# Patient Record
Sex: FEMALE | Race: WHITE | Employment: OTHER | URBAN - METROPOLITAN AREA
[De-identification: names, ages, dates, MRNs, and addresses within clinical notes are randomized per-mention and may not be internally consistent; named-entity substitution may affect disease eponyms.]

---

## 2017-01-23 ENCOUNTER — TELEPHONE (OUTPATIENT)
Dept: FAMILY MEDICINE CLINIC | Facility: CLINIC | Age: 47
End: 2017-01-23

## 2017-01-23 NOTE — TELEPHONE ENCOUNTER
Patient states since Friday She has been having some lower abd cramping. States since She went off Medication Dr Nicol Solorzano had given Her post endoscopies  She had felt fine. Now cramping returning and Menstral Cycle irregular.     Appt given Wed 2/1/17 4:1

## 2017-02-01 ENCOUNTER — OFFICE VISIT (OUTPATIENT)
Dept: FAMILY MEDICINE CLINIC | Facility: CLINIC | Age: 47
End: 2017-02-01

## 2017-02-01 VITALS
HEIGHT: 66 IN | TEMPERATURE: 99 F | DIASTOLIC BLOOD PRESSURE: 70 MMHG | HEART RATE: 88 BPM | RESPIRATION RATE: 16 BRPM | SYSTOLIC BLOOD PRESSURE: 120 MMHG | WEIGHT: 144.13 LBS | BODY MASS INDEX: 23.16 KG/M2

## 2017-02-01 DIAGNOSIS — K90.0 CELIAC DISEASE: ICD-10-CM

## 2017-02-01 DIAGNOSIS — D50.8 OTHER IRON DEFICIENCY ANEMIA: ICD-10-CM

## 2017-02-01 DIAGNOSIS — K52.832 LYMPHOCYTIC COLITIS: Primary | ICD-10-CM

## 2017-02-01 DIAGNOSIS — R10.32 LEFT LOWER QUADRANT PAIN: ICD-10-CM

## 2017-02-01 DIAGNOSIS — N92.0 EXCESSIVE OR FREQUENT MENSTRUATION: ICD-10-CM

## 2017-02-01 PROBLEM — Z00.00 ROUTINE GENERAL MEDICAL EXAMINATION AT A HEALTH CARE FACILITY: Status: ACTIVE | Noted: 2017-01-09

## 2017-02-01 LAB
25-HYDROXYVITAMIN D (TOTAL): 22.3 NG/ML (ref 30–100)
ALBUMIN SERPL-MCNC: 4 G/DL (ref 3.5–4.8)
ALP LIVER SERPL-CCNC: 88 U/L (ref 39–100)
ALT SERPL-CCNC: 16 U/L (ref 14–54)
ANTI-THYROGLOBULIN: <15 U/ML (ref ?–60)
ANTI-THYROPEROXIDASE: <28 U/ML (ref ?–60)
AST SERPL-CCNC: 12 U/L (ref 15–41)
BASOPHILS # BLD AUTO: 0.04 X10(3) UL (ref 0–0.1)
BASOPHILS NFR BLD AUTO: 0.5 %
BILIRUB SERPL-MCNC: 0.3 MG/DL (ref 0.1–2)
BUN BLD-MCNC: 14 MG/DL (ref 8–20)
CALCIUM BLD-MCNC: 8.9 MG/DL (ref 8.3–10.3)
CHLORIDE: 107 MMOL/L (ref 101–111)
CO2: 27 MMOL/L (ref 22–32)
CREAT BLD-MCNC: 0.87 MG/DL (ref 0.55–1.02)
EOSINOPHIL # BLD AUTO: 0.2 X10(3) UL (ref 0–0.3)
EOSINOPHIL NFR BLD AUTO: 2.6 %
ERYTHROCYTE [DISTWIDTH] IN BLOOD BY AUTOMATED COUNT: 12.4 % (ref 11.5–16)
FREE T4: 1.1 NG/DL (ref 0.9–1.8)
GLUCOSE BLD-MCNC: 94 MG/DL (ref 70–99)
HCT VFR BLD AUTO: 42 % (ref 34–50)
HGB BLD-MCNC: 13.9 G/DL (ref 12–16)
IMMATURE GRANULOCYTE COUNT: 0.01 X10(3) UL (ref 0–1)
IMMATURE GRANULOCYTE RATIO %: 0.1 %
IRON SATURATION: 16 % (ref 13–45)
IRON: 54 UG/DL (ref 28–170)
LYMPHOCYTES # BLD AUTO: 2.43 X10(3) UL (ref 0.9–4)
LYMPHOCYTES NFR BLD AUTO: 31.6 %
M PROTEIN MFR SERPL ELPH: 7.7 G/DL (ref 6.1–8.3)
MCH RBC QN AUTO: 31.2 PG (ref 27–33.2)
MCHC RBC AUTO-ENTMCNC: 33.1 G/DL (ref 31–37)
MCV RBC AUTO: 94.4 FL (ref 81–100)
MONOCYTES # BLD AUTO: 0.48 X10(3) UL (ref 0.1–0.6)
MONOCYTES NFR BLD AUTO: 6.2 %
NEUTROPHIL ABS PRELIM: 4.53 X10 (3) UL (ref 1.3–6.7)
NEUTROPHILS # BLD AUTO: 4.53 X10(3) UL (ref 1.3–6.7)
NEUTROPHILS NFR BLD AUTO: 59 %
PLATELET # BLD AUTO: 320 10(3)UL (ref 150–450)
POTASSIUM SERPL-SCNC: 4 MMOL/L (ref 3.6–5.1)
RBC # BLD AUTO: 4.45 X10(6)UL (ref 3.8–5.1)
RED CELL DISTRIBUTION WIDTH-SD: 43 FL (ref 35.1–46.3)
SODIUM SERPL-SCNC: 141 MMOL/L (ref 136–144)
T3FREE SERPL-MCNC: 2.85 PG/ML (ref 2.3–4.2)
TOTAL IRON BINDING CAPACITY: 337 UG/DL (ref 298–536)
TRANSFERRIN: 226 MG/DL (ref 200–360)
TSI SER-ACNC: 1.06 MIU/ML (ref 0.35–5.5)
WBC # BLD AUTO: 7.7 X10(3) UL (ref 4–13)

## 2017-02-01 PROCEDURE — 84482 T3 REVERSE: CPT | Performed by: FAMILY MEDICINE

## 2017-02-01 PROCEDURE — 82627 DEHYDROEPIANDROSTERONE: CPT | Performed by: FAMILY MEDICINE

## 2017-02-01 PROCEDURE — 84439 ASSAY OF FREE THYROXINE: CPT | Performed by: FAMILY MEDICINE

## 2017-02-01 PROCEDURE — 83550 IRON BINDING TEST: CPT | Performed by: FAMILY MEDICINE

## 2017-02-01 PROCEDURE — 84443 ASSAY THYROID STIM HORMONE: CPT | Performed by: FAMILY MEDICINE

## 2017-02-01 PROCEDURE — 85025 COMPLETE CBC W/AUTO DIFF WBC: CPT | Performed by: FAMILY MEDICINE

## 2017-02-01 PROCEDURE — 83540 ASSAY OF IRON: CPT | Performed by: FAMILY MEDICINE

## 2017-02-01 PROCEDURE — 80050 GENERAL HEALTH PANEL: CPT | Performed by: FAMILY MEDICINE

## 2017-02-01 PROCEDURE — 99214 OFFICE O/P EST MOD 30 MIN: CPT | Performed by: FAMILY MEDICINE

## 2017-02-01 PROCEDURE — 86800 THYROGLOBULIN ANTIBODY: CPT | Performed by: FAMILY MEDICINE

## 2017-02-01 PROCEDURE — 82306 VITAMIN D 25 HYDROXY: CPT | Performed by: FAMILY MEDICINE

## 2017-02-01 PROCEDURE — 86376 MICROSOMAL ANTIBODY EACH: CPT | Performed by: FAMILY MEDICINE

## 2017-02-01 PROCEDURE — 80053 COMPREHEN METABOLIC PANEL: CPT | Performed by: FAMILY MEDICINE

## 2017-02-01 PROCEDURE — 84481 FREE ASSAY (FT-3): CPT | Performed by: FAMILY MEDICINE

## 2017-02-01 RX ORDER — MELATONIN
1000 DAILY
COMMUNITY

## 2017-02-01 RX ORDER — CHOLESTYRAMINE LIGHT 4 G/5.7G
POWDER, FOR SUSPENSION ORAL
Refills: 3 | COMMUNITY
Start: 2017-01-10 | End: 2020-06-02 | Stop reason: ALTCHOICE

## 2017-02-01 RX ORDER — FERROUS SULFATE 325(65) MG
325 TABLET ORAL DAILY
COMMUNITY
Start: 2015-10-23 | End: 2020-06-02 | Stop reason: ALTCHOICE

## 2017-02-01 RX ORDER — DIPHENOXYLATE HCL/ATROPINE 2.5-.025MG
TABLET ORAL AS NEEDED
COMMUNITY
Start: 2016-07-18 | End: 2020-09-04

## 2017-02-01 RX ORDER — DULOXETIN HYDROCHLORIDE 20 MG/1
CAPSULE, DELAYED RELEASE ORAL
COMMUNITY
Start: 2014-08-04 | End: 2018-05-30

## 2017-02-01 NOTE — PROGRESS NOTES
[facility] [place]  PROGRESS NOTE  Chief Complaint:   Patient presents with:  Menstrual Problem  Abdominal Pain      HPI:   This is a 50year old female coming in for abdominal pain and abnormal bleeding.   Keena Leone had significant bloating and Oral Tab  Disp:  Rfl:    diphenoxylate-atropine (LOMOTIL) 2.5-0.025 MG Oral Tab  Disp:  Rfl:    DULoxetine HCl (CYMBALTA) 20 MG Oral Cap DR Particles  Disp:  Rfl:    Ferrous Sulfate ([facility] IRON) 325 (65 Fe) MG Oral Tab  Disp:  Rfl:    Hyoscyamine Sulfate 0.1 Temp([facility]) 99 °F (37.2 °C) (Temporal)  Resp 16  Ht 66\"  Wt 144 lb 2 oz  BMI 23.27 kg/m2  LMP 01/18/2017 (Exact Date)  Breastfeeding? No Estimated body mass index is 23.27 kg/(m^2) as calculated from the following:    Height as of this encounter: 66\".     Lorri Funes thyroglobulin ab [E]  - TSH and Free T4 [E]  - Triiodothyronine,Free,Serum [E]  - Reverse T3, Serum [E]  - Vitamin D, 25-Hydroxy [E]  - Iron And Tibc [E]  - CBC W Differential W Platelet  - CBC W/ DIFFERENTIAL; Future  - CBC W/ DIFFERENTIAL    2.  Lymphocyt Reverse T3, Serum [E]  - Vitamin D, 25-Hydroxy [E]  - Iron And Tibc [E]  - CBC W Differential W Platelet  - CBC W/ DIFFERENTIAL; Future  - CBC W/ DIFFERENTIAL    4. Other iron deficiency anemia  She has a history of iron deficiency anemia.   I recommend millie Smear,3 Years due on 01/03/1999  Mammogram,1 Yr due on 01/03/2008  Influenza Vaccine(1) due on 09/01/2016    Patient/Caregiver Education: Patient/Caregiver Education: There are no barriers to learning. Medical education done.    Outcome: Patient verbalizes

## 2017-02-03 LAB — DHEA SULFATE, SERUM: 142 UG/DL

## 2017-02-04 LAB — TRIIODOTHYRONINE, REVERSE: 20.1 NG/DL

## 2017-02-07 ENCOUNTER — TELEPHONE (OUTPATIENT)
Dept: FAMILY MEDICINE CLINIC | Facility: CLINIC | Age: 47
End: 2017-02-07

## 2017-02-09 NOTE — TELEPHONE ENCOUNTER
States She is returning Dr Dowling Franc Call regarding Her Labs.   Antwan Land, 02/08/2017, 7:44 PM

## 2017-02-15 ENCOUNTER — TELEPHONE (OUTPATIENT)
Dept: FAMILY MEDICINE CLINIC | Facility: CLINIC | Age: 47
End: 2017-02-15

## 2017-02-15 DIAGNOSIS — E55.9 VITAMIN D DEFICIENCY: Primary | ICD-10-CM

## 2017-02-15 NOTE — TELEPHONE ENCOUNTER
I called and spoke with Lulu Hayden about her lab results. Her vitamin D level was very low at 22. The rest of her labs were essentially normal.  I recommend that she start taking vitamin D 5000 international units daily.   We will recheck her vitamin D level in

## 2017-04-19 ENCOUNTER — APPOINTMENT (OUTPATIENT)
Dept: LAB | Age: 47
End: 2017-04-19
Attending: FAMILY MEDICINE
Payer: COMMERCIAL

## 2017-04-19 DIAGNOSIS — E55.9 VITAMIN D DEFICIENCY: ICD-10-CM

## 2017-04-19 PROCEDURE — 82306 VITAMIN D 25 HYDROXY: CPT | Performed by: FAMILY MEDICINE

## 2018-01-17 ENCOUNTER — LABORATORY ENCOUNTER (OUTPATIENT)
Dept: LAB | Age: 48
End: 2018-01-17
Attending: FAMILY MEDICINE
Payer: COMMERCIAL

## 2018-01-17 DIAGNOSIS — E65 LOCALIZED ADIPOSITY: Primary | ICD-10-CM

## 2018-01-17 DIAGNOSIS — N64.0 FISSURE OF NIPPLE: ICD-10-CM

## 2018-01-17 LAB
APTT PPP: 32.4 SECONDS (ref 25–34)
BASOPHILS # BLD AUTO: 0.04 X10(3) UL (ref 0–0.1)
BASOPHILS NFR BLD AUTO: 0.7 %
EOSINOPHIL # BLD AUTO: 0.24 X10(3) UL (ref 0–0.3)
EOSINOPHIL NFR BLD AUTO: 4.2 %
ERYTHROCYTE [DISTWIDTH] IN BLOOD BY AUTOMATED COUNT: 12.7 % (ref 11.5–16)
HCT VFR BLD AUTO: 40.5 % (ref 34–50)
HGB BLD-MCNC: 13.6 G/DL (ref 12–16)
IMMATURE GRANULOCYTE COUNT: 0.01 X10(3) UL (ref 0–1)
IMMATURE GRANULOCYTE RATIO %: 0.2 %
INR BLD: 1 (ref 0.89–1.11)
LYMPHOCYTES # BLD AUTO: 1.88 X10(3) UL (ref 0.9–4)
LYMPHOCYTES NFR BLD AUTO: 32.6 %
MCH RBC QN AUTO: 31.5 PG (ref 27–33.2)
MCHC RBC AUTO-ENTMCNC: 33.6 G/DL (ref 31–37)
MCV RBC AUTO: 93.8 FL (ref 81–100)
MONOCYTES # BLD AUTO: 0.47 X10(3) UL (ref 0.1–0.6)
MONOCYTES NFR BLD AUTO: 8.1 %
NEUTROPHIL ABS PRELIM: 3.13 X10 (3) UL (ref 1.3–6.7)
NEUTROPHILS # BLD AUTO: 3.13 X10(3) UL (ref 1.3–6.7)
NEUTROPHILS NFR BLD AUTO: 54.2 %
PLATELET # BLD AUTO: 278 10(3)UL (ref 150–450)
PSA SERPL DL<=0.01 NG/ML-MCNC: 13.2 SECONDS (ref 12–14.3)
RBC # BLD AUTO: 4.32 X10(6)UL (ref 3.8–5.1)
RED CELL DISTRIBUTION WIDTH-SD: 43.8 FL (ref 35.1–46.3)
WBC # BLD AUTO: 5.8 X10(3) UL (ref 4–13)

## 2018-01-17 PROCEDURE — 36415 COLL VENOUS BLD VENIPUNCTURE: CPT

## 2018-01-17 PROCEDURE — 85730 THROMBOPLASTIN TIME PARTIAL: CPT

## 2018-01-17 PROCEDURE — 85025 COMPLETE CBC W/AUTO DIFF WBC: CPT

## 2018-01-17 PROCEDURE — 85610 PROTHROMBIN TIME: CPT

## 2018-04-16 ENCOUNTER — TELEPHONE (OUTPATIENT)
Dept: FAMILY MEDICINE CLINIC | Facility: CLINIC | Age: 48
End: 2018-04-16

## 2018-04-16 DIAGNOSIS — K52.832 LYMPHOCYTIC COLITIS: Primary | ICD-10-CM

## 2018-04-16 NOTE — TELEPHONE ENCOUNTER
Unfortunately, Dr Michel Zuñiga is not in network. I would recommend one of the gastroenterologists in the [address] [place] [address] network. I have met and like Dr Orin Gonzales - he is at the [address] [place] [address] of [facility], [facility].

## 2018-04-16 NOTE — TELEPHONE ENCOUNTER
needs referrel to Dr. Suha Georges at [facility].  needs since has HMO insurance

## 2018-04-16 NOTE — TELEPHONE ENCOUNTER
Chris Hull informed of below. Would like Referal to Dr Salomón Broussard. Please advise.   Mikel Melgoza, 04/16/18, 5:17 PM

## 2018-04-16 NOTE — TELEPHONE ENCOUNTER
As below. Please advise Referal to Dr Duane Fix at [facility].   Ethan Richardson, 04/16/18, 4:54 PM

## 2018-04-26 ENCOUNTER — TELEPHONE (OUTPATIENT)
Dept: GASTROENTEROLOGY | Facility: CLINIC | Age: 48
End: 2018-04-26

## 2018-04-27 NOTE — TELEPHONE ENCOUNTER
Records reviewed:       8/2016 EGD/C-scope (Dr. Maida Carlisle)  -diverticulosis of sigmoid  -int/ext hemorrhoids  -normal ileum  -good prep  -normal appearing colon  -EGD: esophagitis at GE junction, gastric erythema, mild duodenitis --->plan avoid NSAIDS/

## 2018-04-27 NOTE — TELEPHONE ENCOUNTER
Dr Sandie Strickland, pt has appt with you as new pt on 5/21. Received egd/colon records/paths, labs from [facility]. Did not know if you wanted to see them before appt. I attached scan sheet so send on to scan when done. Thanks.

## 2018-05-21 ENCOUNTER — APPOINTMENT (OUTPATIENT)
Dept: LAB | Facility: HOSPITAL | Age: 48
End: 2018-05-21
Attending: INTERNAL MEDICINE
Payer: COMMERCIAL

## 2018-05-21 ENCOUNTER — OFFICE VISIT (OUTPATIENT)
Dept: GASTROENTEROLOGY | Facility: CLINIC | Age: 48
End: 2018-05-21

## 2018-05-21 VITALS
BODY MASS INDEX: 20.25 KG/M2 | HEIGHT: 66 IN | SYSTOLIC BLOOD PRESSURE: 118 MMHG | DIASTOLIC BLOOD PRESSURE: 75 MMHG | WEIGHT: 126 LBS | HEART RATE: 67 BPM

## 2018-05-21 DIAGNOSIS — R19.7 DIARRHEA, UNSPECIFIED TYPE: ICD-10-CM

## 2018-05-21 DIAGNOSIS — K52.832 LYMPHOCYTIC COLITIS: ICD-10-CM

## 2018-05-21 DIAGNOSIS — K90.0 CELIAC DISEASE: ICD-10-CM

## 2018-05-21 DIAGNOSIS — K90.0 CELIAC DISEASE: Primary | ICD-10-CM

## 2018-05-21 PROCEDURE — 83540 ASSAY OF IRON: CPT

## 2018-05-21 PROCEDURE — 82380 ASSAY OF CAROTENE: CPT

## 2018-05-21 PROCEDURE — 99244 OFF/OP CNSLTJ NEW/EST MOD 40: CPT | Performed by: INTERNAL MEDICINE

## 2018-05-21 PROCEDURE — 83735 ASSAY OF MAGNESIUM: CPT

## 2018-05-21 PROCEDURE — 84466 ASSAY OF TRANSFERRIN: CPT

## 2018-05-21 PROCEDURE — 36415 COLL VENOUS BLD VENIPUNCTURE: CPT

## 2018-05-21 PROCEDURE — 84630 ASSAY OF ZINC: CPT

## 2018-05-21 PROCEDURE — 99212 OFFICE O/P EST SF 10 MIN: CPT | Performed by: INTERNAL MEDICINE

## 2018-05-21 PROCEDURE — 82728 ASSAY OF FERRITIN: CPT

## 2018-05-21 PROCEDURE — 82607 VITAMIN B-12: CPT

## 2018-05-21 PROCEDURE — 84425 ASSAY OF VITAMIN B-1: CPT

## 2018-05-21 PROCEDURE — 86256 FLUORESCENT ANTIBODY TITER: CPT

## 2018-05-21 RX ORDER — BUDESONIDE 3 MG/1
9 CAPSULE, COATED PELLETS ORAL EVERY MORNING
Qty: 90 CAPSULE | Refills: 1 | Status: SHIPPED | OUTPATIENT
Start: 2018-05-21 | End: 2018-06-20

## 2018-05-21 NOTE — PATIENT INSTRUCTIONS
1. Budesonide 9mg daily x 4 weeks, then reduce to 6mg x 2 weeks, then reduce to 3mg x 2 weeks, then stop. 2. Okay to continue low FODMAP diet  3. Blood work  4. DEXA scan  5.  Take lomotil as needed 2-3x a week as needed for diarrhea

## 2018-05-21 NOTE — H&P
[address] [facility] [address] Gastroenterology                                                                                                               Reason for consult: C prep  -normal appearing colon  -EGD: esophagitis at GE junction, gastric erythema, mild duodenitis --->plan avoid NSAIDS/and start omep 20/day.      8.2016 PATH  -duodenal biopsy: small bowel wnl, no celiac disease  -gastric biopsies: wnl, neg Hpylori  -di diplopia   RESPIRATORY:  negative for severe shortness of breath  CARDIOVASCULAR:  negative for crushing sub-sternal chest pain  GASTROINTESTINAL:  see HPI  GENITOURINARY:  negative for dysuria or gross hematuria  INTEGUMENT/BREAST:  SKIN:  negative for ja Therefore, I recommend prophylactic administration of pneumococcal vaccine. #Lymphocytic colitis/diarrhea - sx started in 2016 with diarrhea, ab pain. EGD/CLN done at that time with CLN biopsies showed lymphocytic colitis.  She was briefly on budesonide

## 2018-05-25 ENCOUNTER — TELEPHONE (OUTPATIENT)
Dept: GASTROENTEROLOGY | Facility: CLINIC | Age: 48
End: 2018-05-25

## 2018-05-30 RX ORDER — DULOXETIN HYDROCHLORIDE 20 MG/1
20 CAPSULE, DELAYED RELEASE ORAL DAILY
Qty: 90 CAPSULE | Refills: 0 | Status: SHIPPED | OUTPATIENT
Start: 2018-05-30 | End: 2018-08-29

## 2018-05-30 NOTE — TELEPHONE ENCOUNTER
Future appt:     Your appointments     Date & Time Appointment Department [facility])    Jun 11, 2018  8:20 AM CDT XR DEXA BONE DENSITOMETRY with [facility] RAMOS DEXA RM [address] Dexa Department located in [facility])    It is recommended that you wea Results  Component Value Date   T4F 1.1 02/01/2017   TSH 1.060 02/01/2017       No Follow-up on file.

## 2018-06-11 ENCOUNTER — TELEPHONE (OUTPATIENT)
Dept: GASTROENTEROLOGY | Facility: CLINIC | Age: 48
End: 2018-06-11

## 2018-06-11 ENCOUNTER — HOSPITAL ENCOUNTER (OUTPATIENT)
Dept: BONE DENSITY | Age: 48
Discharge: HOME | End: 2018-06-11
Attending: INTERNAL MEDICINE
Payer: COMMERCIAL

## 2018-06-11 DIAGNOSIS — K90.0 CELIAC DISEASE: ICD-10-CM

## 2018-06-11 PROCEDURE — 77080 DXA BONE DENSITY AXIAL: CPT | Performed by: INTERNAL MEDICINE

## 2018-06-11 NOTE — TELEPHONE ENCOUNTER
Left msg for patient re: DEXA showing osteopenia. I recommend she see her PCP and discuss this findings. She is on calcium.

## 2018-06-13 ENCOUNTER — OFFICE VISIT (OUTPATIENT)
Dept: FAMILY MEDICINE CLINIC | Facility: CLINIC | Age: 48
End: 2018-06-13

## 2018-06-13 VITALS
RESPIRATION RATE: 14 BRPM | BODY MASS INDEX: 19.64 KG/M2 | WEIGHT: 122.19 LBS | OXYGEN SATURATION: 98 % | DIASTOLIC BLOOD PRESSURE: 68 MMHG | TEMPERATURE: 98 F | HEIGHT: 66 IN | SYSTOLIC BLOOD PRESSURE: 106 MMHG | HEART RATE: 76 BPM

## 2018-06-13 DIAGNOSIS — M85.88 OSTEOPENIA OF LUMBAR SPINE: ICD-10-CM

## 2018-06-13 DIAGNOSIS — Z00.00 ROUTINE GENERAL MEDICAL EXAMINATION AT A HEALTH CARE FACILITY: Primary | ICD-10-CM

## 2018-06-13 DIAGNOSIS — Z98.890 S/P BILATERAL BREAST REDUCTION: ICD-10-CM

## 2018-06-13 DIAGNOSIS — E55.9 VITAMIN D DEFICIENCY: ICD-10-CM

## 2018-06-13 PROBLEM — M79.7 FIBROMYALGIA: Status: ACTIVE | Noted: 2018-06-13

## 2018-06-13 PROCEDURE — G0439 PPPS, SUBSEQ VISIT: HCPCS | Performed by: NURSE PRACTITIONER

## 2018-06-13 PROCEDURE — 99396 PREV VISIT EST AGE 40-64: CPT | Performed by: NURSE PRACTITIONER

## 2018-06-13 NOTE — PROGRESS NOTES
HPI:    Patient ID: Esther Arriaga is a 46year old female. HPI    Present for annual wellness. States that she is generally doing well. Takes cymbalta for her fibro. Sees Dr. Enma Laguerre for her GI. Will go to [facility] for mammo.  Wanted to do at ou normal. Pupils are equal, round, and reactive to light. Neck: Normal range of motion. Neck supple. Cardiovascular: Normal rate, regular rhythm, normal heart sounds and intact distal pulses.     Pulmonary/Chest: Effort normal and breath sounds normal. Ri

## 2018-06-14 NOTE — PATIENT INSTRUCTIONS
Schedule mammogram.   Continue with current therapy. Recheck annually and as needed - due for pap next year.

## 2018-06-21 ENCOUNTER — PATIENT MESSAGE (OUTPATIENT)
Dept: GASTROENTEROLOGY | Facility: CLINIC | Age: 48
End: 2018-06-21

## 2018-08-29 RX ORDER — DULOXETIN HYDROCHLORIDE 20 MG/1
CAPSULE, DELAYED RELEASE ORAL
Qty: 90 CAPSULE | Refills: 3 | Status: SHIPPED | OUTPATIENT
Start: 2018-08-29 | End: 2020-06-02 | Stop reason: ALTCHOICE

## 2018-08-29 NOTE — TELEPHONE ENCOUNTER
Future appt:    Last Appointment:  6/13/2018, Physical with Melissa    No results found for: CHOLEST, HDL, LDL, TRIGLY, TRIG  No results found for: EAG, A1C    Lab Results  Component Value Date   T4F 1.1 02/01/2017   TSH 1.060 02/01/2017     Last RF:  5/30/2

## 2018-09-24 ENCOUNTER — TELEPHONE (OUTPATIENT)
Dept: FAMILY MEDICINE CLINIC | Facility: CLINIC | Age: 48
End: 2018-09-24

## 2018-09-24 NOTE — TELEPHONE ENCOUNTER
Order dated 6/13/2018 faxed to [address] [place] [address] at the number given below as requested. Patient informed.

## 2018-09-24 NOTE — TELEPHONE ENCOUNTER
Patient has orders for a mammo, would like dr to fax those orders to [facility] fax number [phone]

## 2018-10-15 ENCOUNTER — HOSPITAL ENCOUNTER (OUTPATIENT)
Dept: MAMMOGRAPHY | Age: 48
Discharge: HOME | End: 2018-10-15
Attending: NURSE PRACTITIONER
Payer: COMMERCIAL

## 2018-10-15 DIAGNOSIS — Z98.890 S/P BILATERAL BREAST REDUCTION: ICD-10-CM

## 2018-10-15 PROCEDURE — 77067 SCR MAMMO BI INCL CAD: CPT | Performed by: NURSE PRACTITIONER

## 2018-10-15 PROCEDURE — 77063 BREAST TOMOSYNTHESIS BI: CPT | Performed by: NURSE PRACTITIONER

## 2018-10-16 ENCOUNTER — TELEPHONE (OUTPATIENT)
Dept: FAMILY MEDICINE CLINIC | Facility: CLINIC | Age: 48
End: 2018-10-16

## 2018-10-16 NOTE — TELEPHONE ENCOUNTER
----- Message from MARIA ANTONIA Conti sent at 10/16/2018  9:52 AM CDT -----  Mammogram is read as normal.  Regular follow-up is recommended.   However secondary to the density of the breast, if any concerns would consider further test of molecular breast

## 2019-01-17 ENCOUNTER — TELEPHONE (OUTPATIENT)
Dept: FAMILY MEDICINE CLINIC | Facility: CLINIC | Age: 49
End: 2019-01-17

## 2019-01-17 DIAGNOSIS — Z00.01 ENCOUNTER FOR GENERAL ADULT MEDICAL EXAMINATION WITH ABNORMAL FINDINGS: ICD-10-CM

## 2019-01-17 DIAGNOSIS — K52.832 LYMPHOCYTIC COLITIS: ICD-10-CM

## 2019-01-17 DIAGNOSIS — K90.0 CELIAC DISEASE: Primary | ICD-10-CM

## 2019-01-17 NOTE — TELEPHONE ENCOUNTER
Future Appointments   Date Time Provider Cheryl Durant   1/22/2019 10:15 AM REF [place] REF [facility] Ref Syc

## 2019-01-17 NOTE — TELEPHONE ENCOUNTER
----- Message from Evelyn Ding sent at 1/17/2019  1:31 PM CST -----  Regarding: lab orders needed   Patient has lab appointment. Could you please put lab orders in system.           Thanks,  Christelle

## 2019-01-22 ENCOUNTER — LABORATORY ENCOUNTER (OUTPATIENT)
Dept: LAB | Age: 49
End: 2019-01-22
Attending: FAMILY MEDICINE
Payer: COMMERCIAL

## 2019-01-22 DIAGNOSIS — Z00.01 ENCOUNTER FOR GENERAL ADULT MEDICAL EXAMINATION WITH ABNORMAL FINDINGS: ICD-10-CM

## 2019-01-22 DIAGNOSIS — M85.88 OSTEOPENIA OF LUMBAR SPINE: ICD-10-CM

## 2019-01-22 DIAGNOSIS — E55.9 VITAMIN D DEFICIENCY: ICD-10-CM

## 2019-01-22 DIAGNOSIS — K90.0 CELIAC DISEASE: ICD-10-CM

## 2019-01-22 DIAGNOSIS — K52.832 LYMPHOCYTIC COLITIS: ICD-10-CM

## 2019-01-22 LAB
ALBUMIN SERPL-MCNC: 3.8 G/DL (ref 3.1–4.5)
ALBUMIN/GLOB SERPL: 1.2 {RATIO} (ref 1–2)
ALP LIVER SERPL-CCNC: 80 U/L (ref 41–108)
ALT SERPL-CCNC: 27 U/L (ref 14–54)
ANION GAP SERPL CALC-SCNC: 9 MMOL/L (ref 0–18)
AST SERPL-CCNC: 27 U/L (ref 15–41)
BASOPHILS # BLD AUTO: 0.04 X10(3) UL (ref 0–0.1)
BASOPHILS NFR BLD AUTO: 0.7 %
BILIRUB SERPL-MCNC: 0.5 MG/DL (ref 0.1–2)
BUN BLD-MCNC: 18 MG/DL (ref 8–20)
BUN/CREAT SERPL: 17.3 (ref 10–20)
CALCIUM BLD-MCNC: 9.1 MG/DL (ref 8.3–10.3)
CHLORIDE SERPL-SCNC: 107 MMOL/L (ref 101–111)
CHOLEST SMN-MCNC: 181 MG/DL (ref ?–200)
CO2 SERPL-SCNC: 26 MMOL/L (ref 22–32)
CREAT BLD-MCNC: 1.04 MG/DL (ref 0.55–1.02)
EOSINOPHIL # BLD AUTO: 0.16 X10(3) UL (ref 0–0.3)
EOSINOPHIL NFR BLD AUTO: 2.8 %
ERYTHROCYTE [DISTWIDTH] IN BLOOD BY AUTOMATED COUNT: 12.8 % (ref 11.5–16)
GLOBULIN PLAS-MCNC: 3.2 G/DL (ref 2.8–4.4)
GLUCOSE BLD-MCNC: 89 MG/DL (ref 70–99)
HCT VFR BLD AUTO: 38.9 % (ref 34–50)
HDLC SERPL-MCNC: 81 MG/DL (ref 40–59)
HGB BLD-MCNC: 12.8 G/DL (ref 12–16)
IMMATURE GRANULOCYTE COUNT: 0.01 X10(3) UL (ref 0–1)
IMMATURE GRANULOCYTE RATIO %: 0.2 %
LDLC SERPL CALC-MCNC: 82 MG/DL (ref ?–100)
LYMPHOCYTES # BLD AUTO: 1.84 X10(3) UL (ref 0.9–4)
LYMPHOCYTES NFR BLD AUTO: 32.5 %
M PROTEIN MFR SERPL ELPH: 7 G/DL (ref 6.4–8.2)
MCH RBC QN AUTO: 30.8 PG (ref 27–33.2)
MCHC RBC AUTO-ENTMCNC: 32.9 G/DL (ref 31–37)
MCV RBC AUTO: 93.5 FL (ref 81–100)
MONOCYTES # BLD AUTO: 0.39 X10(3) UL (ref 0.1–1)
MONOCYTES NFR BLD AUTO: 6.9 %
NEUTROPHIL ABS PRELIM: 3.22 X10 (3) UL (ref 1.3–6.7)
NEUTROPHILS # BLD AUTO: 3.22 X10(3) UL (ref 1.3–6.7)
NEUTROPHILS NFR BLD AUTO: 56.9 %
NONHDLC SERPL-MCNC: 100 MG/DL (ref ?–130)
OSMOLALITY SERPL CALC.SUM OF ELEC: 295 MOSM/KG (ref 275–295)
PLATELET # BLD AUTO: 298 10(3)UL (ref 150–450)
POTASSIUM SERPL-SCNC: 3.8 MMOL/L (ref 3.6–5.1)
RBC # BLD AUTO: 4.16 X10(6)UL (ref 3.8–5.1)
RED CELL DISTRIBUTION WIDTH-SD: 44.2 FL (ref 35.1–46.3)
SODIUM SERPL-SCNC: 142 MMOL/L (ref 136–144)
TRIGL SERPL-MCNC: 91 MG/DL (ref 30–149)
TSI SER-ACNC: 1.1 MIU/ML (ref 0.35–5.5)
URATE SERPL-MCNC: 4.9 MG/DL (ref 2.4–8)
VIT D+METAB SERPL-MCNC: 52.2 NG/ML (ref 30–100)
VLDLC SERPL CALC-MCNC: 18 MG/DL (ref 0–30)
WBC # BLD AUTO: 5.7 X10(3) UL (ref 4–13)

## 2019-01-22 PROCEDURE — 80053 COMPREHEN METABOLIC PANEL: CPT

## 2019-01-22 PROCEDURE — 85025 COMPLETE CBC W/AUTO DIFF WBC: CPT

## 2019-01-22 PROCEDURE — 80061 LIPID PANEL: CPT

## 2019-01-22 PROCEDURE — 84443 ASSAY THYROID STIM HORMONE: CPT

## 2019-01-22 PROCEDURE — 36415 COLL VENOUS BLD VENIPUNCTURE: CPT

## 2019-01-22 PROCEDURE — 84550 ASSAY OF BLOOD/URIC ACID: CPT

## 2019-01-22 PROCEDURE — 82306 VITAMIN D 25 HYDROXY: CPT

## 2019-01-23 ENCOUNTER — TELEPHONE (OUTPATIENT)
Dept: FAMILY MEDICINE CLINIC | Facility: CLINIC | Age: 49
End: 2019-01-23

## 2019-05-03 ENCOUNTER — TELEPHONE (OUTPATIENT)
Dept: FAMILY MEDICINE CLINIC | Facility: CLINIC | Age: 49
End: 2019-05-03

## 2019-05-03 NOTE — TELEPHONE ENCOUNTER
Patient would like to know if 's are recommending mmr booster elder patients and at what age is the booster recommended. Please leave a message if needed.

## 2019-05-03 NOTE — TELEPHONE ENCOUNTER
MMR booster is not officially recommended unless there is a specific measles exposure. In [facility] we have had a case of measles but it was kept quarantined and there have been no new cases.   The only group of people I am recommending revaccination f

## 2020-03-31 ENCOUNTER — TELEPHONE (OUTPATIENT)
Dept: FAMILY MEDICINE CLINIC | Facility: CLINIC | Age: 50
End: 2020-03-31

## 2020-03-31 DIAGNOSIS — K52.832 LYMPHOCYTIC COLITIS: Primary | ICD-10-CM

## 2020-03-31 NOTE — TELEPHONE ENCOUNTER
Patient has HMO through [employer]. Needing new Referral to . M/L on VM-- informed she has not been seen here since 2018 and needs to call to schedule appt with Melissa WRIGHT or  for continuation of care and continued Referrals.   Lydia Miramontes,

## 2020-06-02 ENCOUNTER — OFFICE VISIT (OUTPATIENT)
Dept: FAMILY MEDICINE CLINIC | Facility: CLINIC | Age: 50
End: 2020-06-02

## 2020-06-02 VITALS
WEIGHT: 117 LBS | HEART RATE: 72 BPM | RESPIRATION RATE: 18 BRPM | BODY MASS INDEX: 18.8 KG/M2 | TEMPERATURE: 99 F | SYSTOLIC BLOOD PRESSURE: 90 MMHG | DIASTOLIC BLOOD PRESSURE: 60 MMHG | OXYGEN SATURATION: 99 % | HEIGHT: 66 IN

## 2020-06-02 DIAGNOSIS — Z12.31 VISIT FOR SCREENING MAMMOGRAM: ICD-10-CM

## 2020-06-02 DIAGNOSIS — K52.832 LYMPHOCYTIC COLITIS: Primary | ICD-10-CM

## 2020-06-02 DIAGNOSIS — M79.7 FIBROMYALGIA: ICD-10-CM

## 2020-06-02 DIAGNOSIS — K90.0 CELIAC DISEASE: ICD-10-CM

## 2020-06-02 DIAGNOSIS — Z78.0 POSTMENOPAUSAL: ICD-10-CM

## 2020-06-02 PROCEDURE — 99214 OFFICE O/P EST MOD 30 MIN: CPT | Performed by: FAMILY MEDICINE

## 2020-06-02 NOTE — PROGRESS NOTES
[facility] [place]  PROGRESS NOTE  Chief Complaint:   Patient presents with:  Establish Care: Gastro Referral       HPI:   This is a 44year old female coming in for reestablishing care. She has a history of lymphocytic colitis.   She has been 34.0 - 50.0 %    .0 150.0 - 450.0 10(3)uL    MCV 93.5 81.0 - 100.0 fL    MCH 30.8 27.0 - 33.2 pg    MCHC 32.9 31.0 - 37.0 g/dL    RDW 12.8 11.5 - 16.0 %    RDW-SD 44.2 35.1 - 46.3 fL    Neutrophil Absolute Prelim 3.22 1.30 - 6.70 x10 (3) uL    Neutr by mouth daily. Counseling given: Not Answered       REVIEW OF SYSTEMS:   CONSTITUTIONAL: She watches her diet very carefully and feels as though she is doing very well overall.   EENT:  Eyes:  Denies eye pain, visual loss, blurred vision, double vis discharge Ears: External normal. Nose: patent, no nasal discharge Throat:  No tonsillar erythema or exudate. Mouth:  No oral lesions or ulcerations, good dentition. NECK: Supple, no CLAD, no JVD, no thyromegaly.   SKIN: No rashes, no skin lesion, no bruis future. Referral to Dr. Trujillo for additional evaluation. Schedule fasting blood work.   Schedule a mammogram.    Meds & Refills for this Visit:  Requested Prescriptions      No prescriptions requested or ordered in this encounter       Health Maintenance:

## 2020-07-08 ENCOUNTER — OFFICE VISIT (OUTPATIENT)
Dept: FAMILY MEDICINE CLINIC | Facility: CLINIC | Age: 50
End: 2020-07-08

## 2020-07-08 VITALS
WEIGHT: 117 LBS | HEART RATE: 71 BPM | RESPIRATION RATE: 18 BRPM | HEIGHT: 66 IN | OXYGEN SATURATION: 95 % | TEMPERATURE: 99 F | SYSTOLIC BLOOD PRESSURE: 105 MMHG | DIASTOLIC BLOOD PRESSURE: 68 MMHG | BODY MASS INDEX: 18.8 KG/M2

## 2020-07-08 DIAGNOSIS — R35.0 URINE FREQUENCY: Primary | ICD-10-CM

## 2020-07-08 DIAGNOSIS — R30.0 DYSURIA: ICD-10-CM

## 2020-07-08 LAB
APPEARANCE: CLEAR
BILIRUB UR QL STRIP.AUTO: NEGATIVE
BILIRUBIN: NEGATIVE
COLOR UR AUTO: YELLOW
GLUCOSE (URINE DIPSTICK): NEGATIVE MG/DL
GLUCOSE UR STRIP.AUTO-MCNC: NEGATIVE MG/DL
KETONES (URINE DIPSTICK): NEGATIVE MG/DL
KETONES UR STRIP.AUTO-MCNC: NEGATIVE MG/DL
MULTISTIX LOT#: NORMAL NUMERIC
NITRITE UR QL STRIP.AUTO: NEGATIVE
NITRITE, URINE: NEGATIVE
PH UR STRIP.AUTO: 5 [PH] (ref 4.5–8)
PH, URINE: 5.5 (ref 4.5–8)
PROT UR STRIP.AUTO-MCNC: 30 MG/DL
PROTEIN (URINE DIPSTICK): 30 MG/DL
RBC #/AREA URNS AUTO: >10 /HPF
SP GR UR STRIP.AUTO: 1.02 (ref 1–1.03)
SPECIFIC GRAVITY: 1.02 (ref 1–1.03)
UROBILINOGEN UR STRIP.AUTO-MCNC: <2 MG/DL
UROBILINOGEN,SEMI-QN: 0.2 MG/DL (ref 0–1.9)
WBC #/AREA URNS AUTO: >50 /HPF

## 2020-07-08 PROCEDURE — 81001 URINALYSIS AUTO W/SCOPE: CPT | Performed by: NURSE PRACTITIONER

## 2020-07-08 PROCEDURE — 87086 URINE CULTURE/COLONY COUNT: CPT | Performed by: NURSE PRACTITIONER

## 2020-07-08 PROCEDURE — 81003 URINALYSIS AUTO W/O SCOPE: CPT | Performed by: NURSE PRACTITIONER

## 2020-07-08 PROCEDURE — 99213 OFFICE O/P EST LOW 20 MIN: CPT | Performed by: NURSE PRACTITIONER

## 2020-07-08 RX ORDER — NITROFURANTOIN 25; 75 MG/1; MG/1
100 CAPSULE ORAL 2 TIMES DAILY
Qty: 20 CAPSULE | Refills: 0 | Status: SHIPPED | OUTPATIENT
Start: 2020-07-08 | End: 2020-07-18

## 2020-07-08 NOTE — PROGRESS NOTES
HPI:    Patient ID: Marycarmen Rivas is a 44year old female. Patient presents to the clinic today with complaints of urinary frequency, hematuria and dysuria. States symptoms began about 1 day ago, feels like she cannot empty her bladder.   Yayo reactive to light. Neck: Normal range of motion. Cardiovascular: Normal rate and regular rhythm. Pulmonary/Chest: Effort normal and breath sounds normal. No respiratory distress. Abdominal: Soft. Bowel sounds are normal. There is no tenderness.    Emmy Fragoso

## 2020-07-08 NOTE — PATIENT INSTRUCTIONS
Make sure you take entire course of antibiotic, even if you begin to feel better. I recommend you take the antibiotic with either a probiotic or yogurt.  If you decided take a probiotic wait 2 to 3 hours in between to take the antibiotic for proper absorpt

## 2020-07-11 ENCOUNTER — TELEPHONE (OUTPATIENT)
Dept: FAMILY MEDICINE CLINIC | Facility: CLINIC | Age: 50
End: 2020-07-11

## 2020-07-11 NOTE — TELEPHONE ENCOUNTER
----- Message from MATT Goncalves sent at 7/11/2020  7:44 AM CDT -----  Please Let Layman Ignacio know her urine culture came back negative for growth. She does not have to continue taking the abx if she already started it.  Continue pushing lots of fluids

## 2020-09-04 ENCOUNTER — OFFICE VISIT (OUTPATIENT)
Dept: FAMILY MEDICINE CLINIC | Facility: CLINIC | Age: 50
End: 2020-09-04

## 2020-09-04 VITALS
BODY MASS INDEX: 18.84 KG/M2 | HEART RATE: 69 BPM | TEMPERATURE: 99 F | RESPIRATION RATE: 16 BRPM | SYSTOLIC BLOOD PRESSURE: 116 MMHG | OXYGEN SATURATION: 98 % | WEIGHT: 117.19 LBS | HEIGHT: 66 IN | DIASTOLIC BLOOD PRESSURE: 80 MMHG

## 2020-09-04 DIAGNOSIS — Z12.4 SCREENING FOR CERVICAL CANCER: ICD-10-CM

## 2020-09-04 DIAGNOSIS — R10.32 LEFT LOWER QUADRANT PAIN: ICD-10-CM

## 2020-09-04 DIAGNOSIS — K52.832 LYMPHOCYTIC COLITIS: ICD-10-CM

## 2020-09-04 DIAGNOSIS — R30.0 DYSURIA: Primary | ICD-10-CM

## 2020-09-04 LAB
APPEARANCE: CLEAR
BILIRUBIN: NEGATIVE
GLUCOSE (URINE DIPSTICK): NEGATIVE MG/DL
KETONES (URINE DIPSTICK): NEGATIVE MG/DL
LEUKOCYTES: NEGATIVE
MULTISTIX LOT#: NORMAL NUMERIC
NITRITE, URINE: NEGATIVE
OCCULT BLOOD: NEGATIVE
PH, URINE: 5.5 (ref 4.5–8)
PROTEIN (URINE DIPSTICK): NEGATIVE MG/DL
SPECIFIC GRAVITY: 1.03 (ref 1–1.03)
URINE-COLOR: YELLOW
UROBILINOGEN,SEMI-QN: 0.2 MG/DL (ref 0–1.9)

## 2020-09-04 PROCEDURE — 88175 CYTOPATH C/V AUTO FLUID REDO: CPT | Performed by: NURSE PRACTITIONER

## 2020-09-04 PROCEDURE — 3074F SYST BP LT 130 MM HG: CPT | Performed by: NURSE PRACTITIONER

## 2020-09-04 PROCEDURE — 87624 HPV HI-RISK TYP POOLED RSLT: CPT | Performed by: NURSE PRACTITIONER

## 2020-09-04 PROCEDURE — 81003 URINALYSIS AUTO W/O SCOPE: CPT | Performed by: NURSE PRACTITIONER

## 2020-09-04 PROCEDURE — 99214 OFFICE O/P EST MOD 30 MIN: CPT | Performed by: NURSE PRACTITIONER

## 2020-09-04 PROCEDURE — 3079F DIAST BP 80-89 MM HG: CPT | Performed by: NURSE PRACTITIONER

## 2020-09-04 PROCEDURE — 3008F BODY MASS INDEX DOCD: CPT | Performed by: NURSE PRACTITIONER

## 2020-09-04 NOTE — PATIENT INSTRUCTIONS
Schedule CT of abdomen and pelvis    FLOR and sed rate to be done with other labs. If worsens, go to the ER.

## 2020-09-04 NOTE — PROGRESS NOTES
HPI:    Patient ID: Elin Gomez is a 44year old female. HPI     Pain to the left lower quadrant. Pain that used to come with menses. No period for 2 years. Hx of celiac and microscopic colitis. Has tried digestive enzymes and gas ex.    Trie No erythema or tenderness in the vagina. No signs of injury in the vagina. Genitourinary Comments: Palpated the right ovary, unable to palpate the left     Nursing note and vitals reviewed.              ASSESSMENT/PLAN:   Dysuria  (primary encounter

## 2020-09-05 ENCOUNTER — HOSPITAL ENCOUNTER (OUTPATIENT)
Dept: CT IMAGING | Age: 50
Discharge: HOME | End: 2020-09-05
Attending: NURSE PRACTITIONER
Payer: COMMERCIAL

## 2020-09-05 DIAGNOSIS — K52.832 LYMPHOCYTIC COLITIS: ICD-10-CM

## 2020-09-05 DIAGNOSIS — R10.32 LEFT LOWER QUADRANT PAIN: ICD-10-CM

## 2020-09-05 PROCEDURE — 74176 CT ABD & PELVIS W/O CONTRAST: CPT | Performed by: NURSE PRACTITIONER

## 2020-09-08 ENCOUNTER — LABORATORY ENCOUNTER (OUTPATIENT)
Dept: LAB | Age: 50
End: 2020-09-08
Attending: FAMILY MEDICINE
Payer: COMMERCIAL

## 2020-09-08 ENCOUNTER — PATIENT MESSAGE (OUTPATIENT)
Dept: FAMILY MEDICINE CLINIC | Facility: CLINIC | Age: 50
End: 2020-09-08

## 2020-09-08 ENCOUNTER — TELEPHONE (OUTPATIENT)
Dept: FAMILY MEDICINE CLINIC | Facility: CLINIC | Age: 50
End: 2020-09-08

## 2020-09-08 DIAGNOSIS — M79.7 FIBROMYALGIA: ICD-10-CM

## 2020-09-08 DIAGNOSIS — K52.832 LYMPHOCYTIC COLITIS: ICD-10-CM

## 2020-09-08 DIAGNOSIS — Z78.0 POSTMENOPAUSAL: ICD-10-CM

## 2020-09-08 DIAGNOSIS — K90.0 CELIAC DISEASE: ICD-10-CM

## 2020-09-08 LAB
ALBUMIN SERPL-MCNC: 3.6 G/DL (ref 3.4–5)
ALBUMIN/GLOB SERPL: 1 {RATIO} (ref 1–2)
ALP LIVER SERPL-CCNC: 110 U/L (ref 41–108)
ALT SERPL-CCNC: 48 U/L (ref 13–56)
ANION GAP SERPL CALC-SCNC: 6 MMOL/L (ref 0–18)
AST SERPL-CCNC: 30 U/L (ref 15–37)
BASOPHILS # BLD AUTO: 0.03 X10(3) UL (ref 0–0.2)
BASOPHILS NFR BLD AUTO: 0.6 %
BILIRUB SERPL-MCNC: 0.3 MG/DL (ref 0.1–2)
BUN BLD-MCNC: 17 MG/DL (ref 7–18)
BUN/CREAT SERPL: 18.1 (ref 10–20)
CALCIUM BLD-MCNC: 9.8 MG/DL (ref 8.5–10.1)
CHLORIDE SERPL-SCNC: 105 MMOL/L (ref 98–112)
CHOLEST SMN-MCNC: 169 MG/DL (ref ?–200)
CO2 SERPL-SCNC: 28 MMOL/L (ref 21–32)
CREAT BLD-MCNC: 0.94 MG/DL (ref 0.55–1.02)
DEPRECATED RDW RBC AUTO: 47.3 FL (ref 35.1–46.3)
EOSINOPHIL # BLD AUTO: 0.17 X10(3) UL (ref 0–0.7)
EOSINOPHIL NFR BLD AUTO: 3.1 %
ERYTHROCYTE [DISTWIDTH] IN BLOOD BY AUTOMATED COUNT: 13.2 % (ref 11–15)
GLOBULIN PLAS-MCNC: 3.6 G/DL (ref 2.8–4.4)
GLUCOSE BLD-MCNC: 98 MG/DL (ref 70–99)
HCT VFR BLD AUTO: 40.3 % (ref 35–48)
HDLC SERPL-MCNC: 91 MG/DL (ref 40–59)
HGB BLD-MCNC: 12.3 G/DL (ref 12–16)
IMM GRANULOCYTES # BLD AUTO: 0.01 X10(3) UL (ref 0–1)
IMM GRANULOCYTES NFR BLD: 0.2 %
LDLC SERPL CALC-MCNC: 66 MG/DL (ref ?–100)
LYMPHOCYTES # BLD AUTO: 1.64 X10(3) UL (ref 1–4)
LYMPHOCYTES NFR BLD AUTO: 30.3 %
M PROTEIN MFR SERPL ELPH: 7.2 G/DL (ref 6.4–8.2)
MCH RBC QN AUTO: 29.6 PG (ref 26–34)
MCHC RBC AUTO-ENTMCNC: 30.5 G/DL (ref 31–37)
MCV RBC AUTO: 97.1 FL (ref 80–100)
MONOCYTES # BLD AUTO: 0.3 X10(3) UL (ref 0.1–1)
MONOCYTES NFR BLD AUTO: 5.5 %
NEUTROPHILS # BLD AUTO: 3.26 X10 (3) UL (ref 1.5–7.7)
NEUTROPHILS # BLD AUTO: 3.26 X10(3) UL (ref 1.5–7.7)
NEUTROPHILS NFR BLD AUTO: 60.3 %
NONHDLC SERPL-MCNC: 78 MG/DL (ref ?–130)
OSMOLALITY SERPL CALC.SUM OF ELEC: 290 MOSM/KG (ref 275–295)
PATIENT FASTING Y/N/NP: YES
PATIENT FASTING Y/N/NP: YES
PLATELET # BLD AUTO: 340 10(3)UL (ref 150–450)
POTASSIUM SERPL-SCNC: 4.1 MMOL/L (ref 3.5–5.1)
RBC # BLD AUTO: 4.15 X10(6)UL (ref 3.8–5.3)
SED RATE-ML: 15 MM/HR (ref 0–25)
SODIUM SERPL-SCNC: 139 MMOL/L (ref 136–145)
TRIGL SERPL-MCNC: 60 MG/DL (ref 30–149)
TSI SER-ACNC: 1.32 MIU/ML (ref 0.36–3.74)
VIT D+METAB SERPL-MCNC: 43.2 NG/ML (ref 30–100)
VLDLC SERPL CALC-MCNC: 12 MG/DL (ref 0–30)
WBC # BLD AUTO: 5.4 X10(3) UL (ref 4–11)

## 2020-09-08 PROCEDURE — 85025 COMPLETE CBC W/AUTO DIFF WBC: CPT

## 2020-09-08 PROCEDURE — 36415 COLL VENOUS BLD VENIPUNCTURE: CPT

## 2020-09-08 PROCEDURE — 80053 COMPREHEN METABOLIC PANEL: CPT

## 2020-09-08 PROCEDURE — 82306 VITAMIN D 25 HYDROXY: CPT

## 2020-09-08 PROCEDURE — 84443 ASSAY THYROID STIM HORMONE: CPT

## 2020-09-08 PROCEDURE — 86038 ANTINUCLEAR ANTIBODIES: CPT

## 2020-09-08 PROCEDURE — 85652 RBC SED RATE AUTOMATED: CPT

## 2020-09-08 PROCEDURE — 80061 LIPID PANEL: CPT

## 2020-09-08 NOTE — TELEPHONE ENCOUNTER
From: Bianka Lucas  To: MATT Roman  Sent: 9/8/2020 8:16 AM CDT  Subject: Non-Urgent Medical Question    Cherrie Byrd,    I saw the CAT scan report regarding the pelvic/abdominal area. I don’t have any questions.  Save yourself some time-

## 2020-09-08 NOTE — TELEPHONE ENCOUNTER
----- Message from MATT Ho sent at 9/7/2020  6:57 PM CDT -----  CT of abdomen and pelvis shows probable constipation with possible diverticulitis. Recommend Miralax daily with referral to GI. Note to MyChart.

## 2020-09-09 ENCOUNTER — HOSPITAL ENCOUNTER (OUTPATIENT)
Dept: MAMMOGRAPHY | Age: 50
Discharge: HOME | End: 2020-09-09
Attending: FAMILY MEDICINE
Payer: COMMERCIAL

## 2020-09-09 DIAGNOSIS — Z12.31 VISIT FOR SCREENING MAMMOGRAM: ICD-10-CM

## 2020-09-09 LAB
ANA SCREEN: NEGATIVE
HPV I/H RISK 1 DNA SPEC QL NAA+PROBE: NEGATIVE

## 2020-09-09 PROCEDURE — 77063 BREAST TOMOSYNTHESIS BI: CPT | Performed by: FAMILY MEDICINE

## 2020-09-09 PROCEDURE — 77067 SCR MAMMO BI INCL CAD: CPT | Performed by: FAMILY MEDICINE

## 2020-09-10 ENCOUNTER — TELEPHONE (OUTPATIENT)
Dept: FAMILY MEDICINE CLINIC | Facility: CLINIC | Age: 50
End: 2020-09-10

## 2020-09-10 NOTE — TELEPHONE ENCOUNTER
----- Message from MATT Oviedo sent at 9/10/2020 10:21 AM CDT -----  Pap with HPV testing is negative. Repeat in 3 years. Note to MyChart.

## 2020-09-11 ENCOUNTER — TELEPHONE (OUTPATIENT)
Dept: FAMILY MEDICINE CLINIC | Facility: CLINIC | Age: 50
End: 2020-09-11

## 2020-09-11 NOTE — TELEPHONE ENCOUNTER
----- Message from Prasanna Alonzo MD sent at 9/11/2020  7:56 AM CDT -----  Mammogram is normal.  Recheck in 1 year.

## 2020-09-14 ENCOUNTER — OFFICE VISIT (OUTPATIENT)
Dept: FAMILY MEDICINE CLINIC | Facility: CLINIC | Age: 50
End: 2020-09-14

## 2020-09-14 VITALS
DIASTOLIC BLOOD PRESSURE: 62 MMHG | TEMPERATURE: 99 F | HEIGHT: 66 IN | BODY MASS INDEX: 19.09 KG/M2 | HEART RATE: 68 BPM | RESPIRATION RATE: 16 BRPM | WEIGHT: 118.81 LBS | SYSTOLIC BLOOD PRESSURE: 112 MMHG

## 2020-09-14 DIAGNOSIS — K90.0 CELIAC DISEASE: ICD-10-CM

## 2020-09-14 DIAGNOSIS — R10.32 LEFT LOWER QUADRANT ABDOMINAL PAIN: ICD-10-CM

## 2020-09-14 DIAGNOSIS — R19.7 DIARRHEA, UNSPECIFIED TYPE: ICD-10-CM

## 2020-09-14 DIAGNOSIS — Z00.00 ROUTINE GENERAL MEDICAL EXAMINATION AT A HEALTH CARE FACILITY: Primary | ICD-10-CM

## 2020-09-14 DIAGNOSIS — K52.832 LYMPHOCYTIC COLITIS: ICD-10-CM

## 2020-09-14 PROCEDURE — 3008F BODY MASS INDEX DOCD: CPT | Performed by: FAMILY MEDICINE

## 2020-09-14 PROCEDURE — 3078F DIAST BP <80 MM HG: CPT | Performed by: FAMILY MEDICINE

## 2020-09-14 PROCEDURE — 99396 PREV VISIT EST AGE 40-64: CPT | Performed by: FAMILY MEDICINE

## 2020-09-14 PROCEDURE — 3074F SYST BP LT 130 MM HG: CPT | Performed by: FAMILY MEDICINE

## 2020-09-14 PROCEDURE — G0438 PPPS, INITIAL VISIT: HCPCS | Performed by: FAMILY MEDICINE

## 2020-09-14 PROCEDURE — 90686 IIV4 VACC NO PRSV 0.5 ML IM: CPT | Performed by: FAMILY MEDICINE

## 2020-09-14 PROCEDURE — 90471 IMMUNIZATION ADMIN: CPT | Performed by: FAMILY MEDICINE

## 2020-09-14 NOTE — PROGRESS NOTES
[place] [facility] [place]  PROGRESS NOTE  Chief Complaint:   Patient presents with:  Physical: Pap Done by Romy Nobles      HPI:   This is a 44year old female coming in for her annual wellness exam.  She did actually have her Pap smear done a little over Chol 78 <130 mg/dL    FASTING Yes    ASSAY, THYROID STIM HORMONE   Result Value Ref Range    TSH 1.320 0.358 - 3.740 mIU/mL   VITAMIN D, 25-HYDROXY   Result Value Ref Range    Vitamin D, 25OH, Total 43.2 30.0 - 100.0 ng/mL   SED RATE, VINCENZO (AUTOMATED Allergies:    Iodine (Topical)        ANAPHYLAXIS  Current Meds:  Current Outpatient Medications   Medication Sig Dispense Refill   • cholecalciferol (VITAMIN D3) 125 MCG (5000 UT) Oral Cap Take 5,000 Units by mouth daily.      • Multiple Vitamins- Wt 118 lb 12.8 oz (53.9 kg)   BMI 19.17 kg/m²  Estimated body mass index is 19.17 kg/m² as calculated from the following:    Height as of this encounter: 66\". Weight as of this encounter: 118 lb 12.8 oz (53.9 kg). Vital signs reviewed.   Appears stat history of colitis. Her CAT scan showed some inflammatory changes in the sigmoid colon. At this time it is difficult to know if those inflammatory changes are diverticulitis or colitis. Plan: See Dr. Umana Room for colonoscopy.     5. Left lower quadrant abdo

## 2020-09-14 NOTE — PATIENT INSTRUCTIONS
Try one tsp of Miralax daily sprinkled over breakfast to keep bowels regular; adjust amount as needed. See Dr Jeanette Smith for colonoscopy.

## 2020-09-15 ENCOUNTER — PATIENT MESSAGE (OUTPATIENT)
Dept: GASTROENTEROLOGY | Facility: CLINIC | Age: 50
End: 2020-09-15

## 2020-09-15 NOTE — TELEPHONE ENCOUNTER
Pt contacted and offered her appt with COURTNEY GUTIERREZ, she accepted the following appt, directions provided to the [facility], and told to arrive 15 mins earlier:  Future Appointments   Date Time Provider Cheryl Durant   9/16/2020  1:00 PM MATT Mitchell

## 2020-09-16 ENCOUNTER — OFFICE VISIT (OUTPATIENT)
Dept: GASTROENTEROLOGY | Facility: CLINIC | Age: 50
End: 2020-09-16

## 2020-09-16 VITALS
HEART RATE: 66 BPM | DIASTOLIC BLOOD PRESSURE: 88 MMHG | WEIGHT: 116 LBS | SYSTOLIC BLOOD PRESSURE: 132 MMHG | BODY MASS INDEX: 18.64 KG/M2 | HEIGHT: 66 IN

## 2020-09-16 DIAGNOSIS — K90.0 CELIAC DISEASE: Primary | ICD-10-CM

## 2020-09-16 DIAGNOSIS — R10.32 LLQ PAIN: ICD-10-CM

## 2020-09-16 DIAGNOSIS — R93.89 ABNORMAL CT SCAN: ICD-10-CM

## 2020-09-16 PROCEDURE — 3079F DIAST BP 80-89 MM HG: CPT | Performed by: NURSE PRACTITIONER

## 2020-09-16 PROCEDURE — 3075F SYST BP GE 130 - 139MM HG: CPT | Performed by: NURSE PRACTITIONER

## 2020-09-16 PROCEDURE — 3008F BODY MASS INDEX DOCD: CPT | Performed by: NURSE PRACTITIONER

## 2020-09-16 PROCEDURE — 99214 OFFICE O/P EST MOD 30 MIN: CPT | Performed by: NURSE PRACTITIONER

## 2020-09-16 RX ORDER — CIPROFLOXACIN 500 MG/1
500 TABLET, FILM COATED ORAL 2 TIMES DAILY
Qty: 14 TABLET | Refills: 0 | Status: SHIPPED | OUTPATIENT
Start: 2020-09-16 | End: 2020-09-23

## 2020-09-16 RX ORDER — METRONIDAZOLE 500 MG/1
500 TABLET ORAL EVERY 8 HOURS
Qty: 21 TABLET | Refills: 0 | Status: SHIPPED | OUTPATIENT
Start: 2020-09-16 | End: 2020-09-23

## 2020-09-16 NOTE — PROGRESS NOTES
[facility] - Gastroenterology                                                                                                               Reason for consult: f/u    Requesting physician or provider: [facility] hemorrhoids  -normal ileum  -good prep  -normal appearing colon  -EGD: esophagitis at GE junction, gastric erythema, mild duodenitis --->plan avoid NSAIDS/and start omep 20/day.      8.2016 PATH  -duodenal biopsy: small bowel wnl, no celiac disease  -gastr mouth daily. • Calcium Carb-Cholecalciferol (CALCIUM + D3) 600-200 MG-UNIT Oral Tab Take 1 tablet by mouth daily. • Vitamin B-12 1000 MCG Oral Tab Take 1,000 mcg by mouth daily.          Allergies:    Iodine (Topical)        ANAPHYLAXIS    ROS: RBC 4.15 3.80 - 5.30 x10(6)uL    HGB 12.3 12.0 - 16.0 g/dL    HCT 40.3 35.0 - 48.0 %    .0 150.0 - 450.0 10(3)uL    MCV 97.1 80.0 - 100.0 fL    MCH 29.6 26.0 - 34.0 pg    MCHC 30.5 (L) 31.0 - 37.0 g/dL    RDW 13.2 11.0 - 15.0 %    RDW-SD 47.3 (H) complete set of results can be found in Results Review.        .  ASSESSMENT/PLAN:   Serina Blandon is a 44year old year-old female with history of celiac disease and lymphocytic colitis     #LLQ pain  #abnormal CT  #celiac  She is here today for ev identified, these errors have been corrected.  While every attempt is made to correct errors during dictation, discrepancies may still exist.

## 2020-09-21 ENCOUNTER — LAB ENCOUNTER (OUTPATIENT)
Dept: LAB | Age: 50
End: 2020-09-21
Attending: FAMILY MEDICINE
Payer: COMMERCIAL

## 2020-09-21 DIAGNOSIS — K90.0 CELIAC DISEASE: ICD-10-CM

## 2020-09-21 LAB
CERULOPLASMIN SERPL-MCNC: 29 MG/DL (ref 20–60)
DEPRECATED HBV CORE AB SER IA-ACNC: 108.6 NG/ML
FOLATE SERPL-MCNC: 7.4 NG/ML (ref 8.7–?)
IGA SERPL-MCNC: 126 MG/DL (ref 70–312)
INR BLD: 1.08 (ref 0.89–1.11)
IRON SATURATION: 24 %
IRON SERPL-MCNC: 77 UG/DL
PSA SERPL DL<=0.01 NG/ML-MCNC: 14.3 SECONDS (ref 12.4–14.6)
TOTAL IRON BINDING CAPACITY: 320 UG/DL (ref 240–450)
TRANSFERRIN SERPL-MCNC: 215 MG/DL (ref 200–360)
VIT B12 SERPL-MCNC: >2000 PG/ML (ref 193–986)
VIT D+METAB SERPL-MCNC: 54.6 NG/ML (ref 30–100)

## 2020-09-21 PROCEDURE — 85610 PROTHROMBIN TIME: CPT

## 2020-09-21 PROCEDURE — 82390 ASSAY OF CERULOPLASMIN: CPT

## 2020-09-21 PROCEDURE — 82607 VITAMIN B-12: CPT

## 2020-09-21 PROCEDURE — 82784 ASSAY IGA/IGD/IGG/IGM EACH: CPT

## 2020-09-21 PROCEDURE — 82746 ASSAY OF FOLIC ACID SERUM: CPT

## 2020-09-21 PROCEDURE — 36415 COLL VENOUS BLD VENIPUNCTURE: CPT

## 2020-09-21 PROCEDURE — 84630 ASSAY OF ZINC: CPT

## 2020-09-21 PROCEDURE — 82306 VITAMIN D 25 HYDROXY: CPT

## 2020-09-21 PROCEDURE — 84590 ASSAY OF VITAMIN A: CPT

## 2020-09-21 PROCEDURE — 83550 IRON BINDING TEST: CPT

## 2020-09-21 PROCEDURE — 84446 ASSAY OF VITAMIN E: CPT

## 2020-09-21 PROCEDURE — 83516 IMMUNOASSAY NONANTIBODY: CPT

## 2020-09-21 PROCEDURE — 82380 ASSAY OF CAROTENE: CPT

## 2020-09-21 PROCEDURE — 82728 ASSAY OF FERRITIN: CPT

## 2020-09-21 PROCEDURE — 83540 ASSAY OF IRON: CPT

## 2020-09-23 LAB
TTG IGA SER-ACNC: 0.5 U/ML (ref ?–7)
ZINC: 97.2 UG/DL

## 2020-09-25 LAB
ALPHA-TOCOPHEROL (VIT E) -MG/L: 10.4 MG/L
CAROTENE, TOTAL, SERUM: 58 UG/DL
GAMMA-TOCOPHEROL (VIT E) -MG/L: 1 MG/L
INTERPRETATION VIT A, SER/PLA: NORMAL
RETINYL PALMITATE: <0.02 MG/L
VITAMIN A (RETINOL): 0.64 MG/L

## 2020-09-28 ENCOUNTER — TELEPHONE (OUTPATIENT)
Dept: GASTROENTEROLOGY | Facility: CLINIC | Age: 50
End: 2020-09-28

## 2020-09-28 DIAGNOSIS — E50.9 VITAMIN A DEFICIENCY: Primary | ICD-10-CM

## 2020-09-28 NOTE — TELEPHONE ENCOUNTER
Carotene mildly low and suggest increasing amount of vit a rich foods in diet and repeating lelvel in 4 weeks. Left detailed message with results and recommendations for pt.

## 2020-09-29 ENCOUNTER — TELEPHONE (OUTPATIENT)
Dept: GASTROENTEROLOGY | Facility: CLINIC | Age: 50
End: 2020-09-29

## 2020-09-29 NOTE — TELEPHONE ENCOUNTER
As part of w/u of her celiac we were looking for common deficiencies which was why  Copper/ceruloplasmin was ordered and results were normal.     Plan to repeat carotene level in 4 weeks.

## 2020-09-29 NOTE — TELEPHONE ENCOUNTER
Patient's FYI states ok to leave detailed messages on cell voicemail. I left Alice's message below. Also informed that the carotene order is in the computer and I also mailed a copy, due 10/21. Instructed her to go on [employer] or [employer] to make jose

## 2020-09-29 NOTE — TELEPHONE ENCOUNTER
Lars EM, just to confirm, I will mail the lab order to patient with due date marked 10/21 and call her also to remind to make lab appt. It appears that below testing you ordered was resulted and negative for Jassi's disease. Would this be the FLOR?  Shailesh Gaona

## 2020-10-26 NOTE — PROGRESS NOTES
[address] [facility] [address] Gastroenterology                                                                                                               Reason for consult: f malignancy     From GI attending MD note 5/21/18:  Prior endoscopies:  8/2016 EGD/C-scope (Dr. Moshe Xie)  -diverticulosis of sigmoid  -int/ext hemorrhoids  -normal ileum  -good prep  -normal appearing colon  -EGD: esophagitis at GE junction, gastric e • Vitamin B-12 1000 MCG Oral Tab Take 1,000 mcg by mouth daily.          Allergies:    Iodine (Topical)        ANAPHYLAXIS    ROS:   CONSTITUTIONAL: negative for fevers, chills, sweats and weight loss  EYES Negative for red eyes, yellow eyes, changes in 0.10 mg/L <0.02    Interpretation Vit A, Serum/Plasma  Normal      Component   Ref Range & Units 9/21/20  1:29 PM   Vitamin D, 25OH, Total   30.0 - 100.0 ng/mL 54.6      Ref Range & Units 9/21/20  1:29 PM   Alpha-Tocopherol (Vit E) -mg/L   5.5 - 18.0 mg/L reported llq pain, so was treated with cipro/flagyl. Today reports improvement in llq pain and is moving bowels regularly with use of miralax once daily.   Her primary complaint is bloating after eating and has been on fodmap, GFD w/o significant improveme

## 2020-10-27 ENCOUNTER — LAB ENCOUNTER (OUTPATIENT)
Dept: LAB | Age: 50
End: 2020-10-27
Attending: FAMILY MEDICINE
Payer: COMMERCIAL

## 2020-10-27 DIAGNOSIS — E50.9 VITAMIN A DEFICIENCY: ICD-10-CM

## 2020-10-27 PROCEDURE — 82380 ASSAY OF CAROTENE: CPT

## 2020-10-27 PROCEDURE — 36415 COLL VENOUS BLD VENIPUNCTURE: CPT

## 2020-10-28 ENCOUNTER — TELEPHONE (OUTPATIENT)
Dept: FAMILY MEDICINE CLINIC | Facility: CLINIC | Age: 50
End: 2020-10-28

## 2020-10-28 ENCOUNTER — OFFICE VISIT (OUTPATIENT)
Dept: GASTROENTEROLOGY | Facility: CLINIC | Age: 50
End: 2020-10-28

## 2020-10-28 VITALS
TEMPERATURE: 98 F | SYSTOLIC BLOOD PRESSURE: 119 MMHG | HEIGHT: 66 IN | DIASTOLIC BLOOD PRESSURE: 77 MMHG | WEIGHT: 119 LBS | BODY MASS INDEX: 19.13 KG/M2

## 2020-10-28 DIAGNOSIS — R14.2 BELCHING: ICD-10-CM

## 2020-10-28 DIAGNOSIS — Z23 NEED FOR VACCINATION: Primary | ICD-10-CM

## 2020-10-28 DIAGNOSIS — K90.0 CELIAC DISEASE: ICD-10-CM

## 2020-10-28 DIAGNOSIS — R14.0 BLOATING: ICD-10-CM

## 2020-10-28 DIAGNOSIS — K90.0 CELIAC DISEASE: Primary | ICD-10-CM

## 2020-10-28 PROCEDURE — 3078F DIAST BP <80 MM HG: CPT | Performed by: NURSE PRACTITIONER

## 2020-10-28 PROCEDURE — 99214 OFFICE O/P EST MOD 30 MIN: CPT | Performed by: NURSE PRACTITIONER

## 2020-10-28 PROCEDURE — 3008F BODY MASS INDEX DOCD: CPT | Performed by: NURSE PRACTITIONER

## 2020-10-28 PROCEDURE — 3074F SYST BP LT 130 MM HG: CPT | Performed by: NURSE PRACTITIONER

## 2020-10-28 NOTE — PATIENT INSTRUCTIONS
-continue miralax  -labs  -align once daily  -pepcid 20 mg as needed for belching  -pneumococcal vaccine with pcp  -consider repeat egd

## 2020-10-29 ENCOUNTER — TELEPHONE (OUTPATIENT)
Dept: FAMILY MEDICINE CLINIC | Facility: CLINIC | Age: 50
End: 2020-10-29

## 2020-10-29 ENCOUNTER — TELEPHONE (OUTPATIENT)
Dept: GASTROENTEROLOGY | Facility: CLINIC | Age: 50
End: 2020-10-29

## 2020-10-29 DIAGNOSIS — R14.0 BLOATING: ICD-10-CM

## 2020-10-29 DIAGNOSIS — K90.0 CELIAC DISEASE: Primary | ICD-10-CM

## 2020-10-29 NOTE — TELEPHONE ENCOUNTER
Patient with celiac and history of hyposplenism per note from gastro note review. Order for [address], will need [id number] in one year.

## 2020-10-29 NOTE — TELEPHONE ENCOUNTER
Please advise pneumonia vaccine order as requested below. Patient is 44years old. Do not see an immunization record of previous pneumonia vaccine. Future appt:     Your appointments     Date & Time Appointment Department [facility])    Nov 03, 2020  1:0 09/08/2020 60     No results found for: EAG, A1C  Lab Results   Component Value Date    T4F 1.1 02/01/2017    TSH 1.320 09/08/2020       No follow-ups on file.

## 2020-10-29 NOTE — TELEPHONE ENCOUNTER
Scheduling please contact patient to schedule egd w/ donn w/ Dr. Jeanette Smith  Dx: celiac, bloating

## 2020-10-29 NOTE — TELEPHONE ENCOUNTER
Her last egd was 2016 at [facility]. Given symptoms of bloating, upper abdominal discomfort not improved on fodmap diet, h/o celiac, plan for:    Egd w/ [facility] w/ Dr. Mariana Salgado.   Dx: celiac, bloating    ENDOSCOPIC RISK BENEFIT DISCUSSION: I described the procedure in gr

## 2020-11-03 ENCOUNTER — LAB ENCOUNTER (OUTPATIENT)
Dept: LAB | Age: 50
End: 2020-11-03
Attending: FAMILY MEDICINE
Payer: COMMERCIAL

## 2020-11-03 ENCOUNTER — TELEPHONE (OUTPATIENT)
Dept: FAMILY MEDICINE CLINIC | Facility: CLINIC | Age: 50
End: 2020-11-03

## 2020-11-03 ENCOUNTER — NURSE ONLY (OUTPATIENT)
Dept: FAMILY MEDICINE CLINIC | Facility: CLINIC | Age: 50
End: 2020-11-03

## 2020-11-03 VITALS — TEMPERATURE: 99 F

## 2020-11-03 DIAGNOSIS — Z23 NEED FOR VACCINATION: Primary | ICD-10-CM

## 2020-11-03 DIAGNOSIS — Z23 NEED FOR VACCINATION: ICD-10-CM

## 2020-11-03 DIAGNOSIS — K90.0 CELIAC DISEASE: ICD-10-CM

## 2020-11-03 PROCEDURE — 90670 PCV13 VACCINE IM: CPT | Performed by: NURSE PRACTITIONER

## 2020-11-03 PROCEDURE — 90471 IMMUNIZATION ADMIN: CPT | Performed by: NURSE PRACTITIONER

## 2020-11-03 PROCEDURE — 36415 COLL VENOUS BLD VENIPUNCTURE: CPT

## 2020-11-03 PROCEDURE — 86256 FLUORESCENT ANTIBODY TITER: CPT

## 2020-11-03 NOTE — TELEPHONE ENCOUNTER
Patient is 44 with Celiac disease. She had her Prevnar 13  Vaccine today. Asking how long she should wait before she gets the pneumovax 23? Order pended.

## 2020-11-03 NOTE — PROGRESS NOTES
Patient here for Prevnar 13 vaccine. Patient denies any previous vaccine allergies or acute reactions. Prevnar 13 given left arm  Vaccine well tolerated by patient. Vaccine information sheet given and common side effects discussed.    Patient left

## 2020-11-04 NOTE — TELEPHONE ENCOUNTER
Patient notified of Dr. Bey Dank instructions. Patient decided to wait for next year for pneumovax. This will be after 11/4/21. Order deferred.

## 2020-11-05 ENCOUNTER — PATIENT MESSAGE (OUTPATIENT)
Dept: GASTROENTEROLOGY | Facility: CLINIC | Age: 50
End: 2020-11-05

## 2020-11-06 NOTE — TELEPHONE ENCOUNTER
From: Obed York  To: Marilyn Baez  Sent: 11/5/2020 7:50 PM CST  Subject: Non-Urgent Medical Question    Alice Rader,    I received your voice mail last week regarding the discussion you had with Dr. Fermin Sierra.  In the message you ref

## 2020-11-06 NOTE — TELEPHONE ENCOUNTER
GI Schedulers-    Pt sent [employer] message requesting to schedule, please call and schedule, thank you.

## 2020-11-09 NOTE — TELEPHONE ENCOUNTER
Scheduled for:  [phone]  Provider Name:  Dr. Umana Room  Date:  1/5/21  Location:    [facility]  Sedation:  MAC  Time:  1330 (pt is aware to arrive at 1230)   Prep:  Nothing to eat after midnight    Nothing to drink after 1000 the day of the procedure   Sent via [employer]

## 2020-12-21 ENCOUNTER — TELEPHONE (OUTPATIENT)
Dept: GASTROENTEROLOGY | Facility: CLINIC | Age: 50
End: 2020-12-21

## 2020-12-21 DIAGNOSIS — R14.0 BLOATING: ICD-10-CM

## 2020-12-21 DIAGNOSIS — K90.0 CELIAC DISEASE: Primary | ICD-10-CM

## 2020-12-21 NOTE — TELEPHONE ENCOUNTER
Pt states she needs to cancel and reschedule her endoscopy that is scheduled for Jan 5th.  Please call

## 2020-12-23 NOTE — TELEPHONE ENCOUNTER
Rescheduled for:  EGD 18112  Provider Name:  Dr. Keron Linares  Date:    From-1/5/21  To-2/10/21  Location:    [facility]  Sedation:  MAC  Time:    From-1330   To-1130 (pt is aware to arrive at 1030)   Prep: NPO after midnight, sent new instructions via [employer]

## 2021-01-05 ENCOUNTER — TELEPHONE (OUTPATIENT)
Dept: FAMILY MEDICINE CLINIC | Facility: CLINIC | Age: 51
End: 2021-01-05

## 2021-01-05 DIAGNOSIS — Z02.1 PRE-EMPLOYMENT EXAMINATION: Primary | ICD-10-CM

## 2021-01-05 NOTE — TELEPHONE ENCOUNTER
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   Please let her know  Future Appointments   Date Time Provider Cheryl Durant   2/10/2021 11:30 AM [facility], PROCEDURE [id number] None

## 2021-01-13 ENCOUNTER — LAB ENCOUNTER (OUTPATIENT)
Dept: LAB | Age: 51
End: 2021-01-13
Attending: FAMILY MEDICINE
Payer: COMMERCIAL

## 2021-01-13 DIAGNOSIS — Z02.1 PRE-EMPLOYMENT EXAMINATION: ICD-10-CM

## 2021-01-13 PROCEDURE — 86480 TB TEST CELL IMMUN MEASURE: CPT

## 2021-01-13 PROCEDURE — 36415 COLL VENOUS BLD VENIPUNCTURE: CPT

## 2021-01-16 ENCOUNTER — TELEPHONE (OUTPATIENT)
Dept: FAMILY MEDICINE CLINIC | Facility: CLINIC | Age: 51
End: 2021-01-16

## 2021-01-16 LAB
M TB IFN-G CD4+ T-CELLS BLD-ACNC: 0.03 IU/ML
M TB TUBERC IFN-G BLD QL: NEGATIVE
M TB TUBERC IGNF/MITOGEN IGNF CONTROL: >10 IU/ML
QUANTIFERON TB1 MINUS NIL: -0.01 IU/ML
QUANTIFERON TB2 MINUS NIL: -0.01 IU/ML

## 2021-01-16 NOTE — TELEPHONE ENCOUNTER
----- Message from MATT Delgado sent at 1/16/2021 11:30 AM CST -----  Dr. Julianna Cruz out of the office. QuantiFERON gold is negative.   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

## 2021-01-18 NOTE — TELEPHONE ENCOUNTER
Results documented by MATT Magaña. Form given to [facility] in Dr. Demi Mims absence this week.

## 2021-01-18 NOTE — TELEPHONE ENCOUNTER
I reviewed test results and signed portion of TB for the form though Dr. Esthela Judge was the provider who did the wellness exam in September so I cannot sign that physical portion. Dr. Esthela Judge back in the office 01/25/21.

## 2021-02-07 ENCOUNTER — LAB ENCOUNTER (OUTPATIENT)
Dept: LAB | Facility: HOSPITAL | Age: 51
End: 2021-02-07
Attending: INTERNAL MEDICINE
Payer: COMMERCIAL

## 2021-02-07 DIAGNOSIS — Z01.818 PRE-OP TESTING: ICD-10-CM

## 2021-02-08 LAB — SARS-COV-2 RNA RESP QL NAA+PROBE: NOT DETECTED

## 2021-02-10 ENCOUNTER — HOSPITAL ENCOUNTER (OUTPATIENT)
Facility: HOSPITAL | Age: 51
Setting detail: HOSPITAL OUTPATIENT SURGERY
Discharge: HOME | End: 2021-02-10
Attending: INTERNAL MEDICINE | Admitting: INTERNAL MEDICINE
Payer: COMMERCIAL

## 2021-02-10 ENCOUNTER — PATIENT MESSAGE (OUTPATIENT)
Dept: GASTROENTEROLOGY | Facility: CLINIC | Age: 51
End: 2021-02-10

## 2021-02-10 ENCOUNTER — ANESTHESIA EVENT (OUTPATIENT)
Dept: ENDOSCOPY | Facility: HOSPITAL | Age: 51
End: 2021-02-10
Payer: COMMERCIAL

## 2021-02-10 ENCOUNTER — PATIENT MESSAGE (OUTPATIENT)
Dept: FAMILY MEDICINE CLINIC | Facility: CLINIC | Age: 51
End: 2021-02-10

## 2021-02-10 ENCOUNTER — ANESTHESIA (OUTPATIENT)
Dept: ENDOSCOPY | Facility: HOSPITAL | Age: 51
End: 2021-02-10
Payer: COMMERCIAL

## 2021-02-10 VITALS
BODY MASS INDEX: 19.29 KG/M2 | WEIGHT: 120 LBS | SYSTOLIC BLOOD PRESSURE: 118 MMHG | RESPIRATION RATE: 20 BRPM | TEMPERATURE: 97 F | HEIGHT: 66 IN | DIASTOLIC BLOOD PRESSURE: 76 MMHG | HEART RATE: 60 BPM | OXYGEN SATURATION: 100 %

## 2021-02-10 DIAGNOSIS — R14.0 BLOATING: ICD-10-CM

## 2021-02-10 DIAGNOSIS — Z01.818 PRE-OP TESTING: Primary | ICD-10-CM

## 2021-02-10 DIAGNOSIS — Z78.0 POSTMENOPAUSAL: Primary | ICD-10-CM

## 2021-02-10 DIAGNOSIS — K90.0 CELIAC DISEASE: ICD-10-CM

## 2021-02-10 PROCEDURE — 0DB68ZX EXCISION OF STOMACH, VIA NATURAL OR ARTIFICIAL OPENING ENDOSCOPIC, DIAGNOSTIC: ICD-10-PCS | Performed by: INTERNAL MEDICINE

## 2021-02-10 PROCEDURE — 0DB98ZX EXCISION OF DUODENUM, VIA NATURAL OR ARTIFICIAL OPENING ENDOSCOPIC, DIAGNOSTIC: ICD-10-PCS | Performed by: INTERNAL MEDICINE

## 2021-02-10 PROCEDURE — 43239 EGD BIOPSY SINGLE/MULTIPLE: CPT | Performed by: INTERNAL MEDICINE

## 2021-02-10 RX ORDER — NALOXONE HYDROCHLORIDE 0.4 MG/ML
80 INJECTION, SOLUTION INTRAMUSCULAR; INTRAVENOUS; SUBCUTANEOUS AS NEEDED
Status: DISCONTINUED | OUTPATIENT
Start: 2021-02-10 | End: 2021-02-10

## 2021-02-10 RX ORDER — SODIUM CHLORIDE, SODIUM LACTATE, POTASSIUM CHLORIDE, CALCIUM CHLORIDE 600; 310; 30; 20 MG/100ML; MG/100ML; MG/100ML; MG/100ML
INJECTION, SOLUTION INTRAVENOUS CONTINUOUS
Status: DISCONTINUED | OUTPATIENT
Start: 2021-02-10 | End: 2021-02-10

## 2021-02-10 RX ORDER — LIDOCAINE HYDROCHLORIDE 10 MG/ML
INJECTION, SOLUTION EPIDURAL; INFILTRATION; INTRACAUDAL; PERINEURAL AS NEEDED
Status: DISCONTINUED | OUTPATIENT
Start: 2021-02-10 | End: 2021-02-10 | Stop reason: SURG

## 2021-02-10 RX ADMIN — SODIUM CHLORIDE, SODIUM LACTATE, POTASSIUM CHLORIDE, CALCIUM CHLORIDE: 600; 310; 30; 20 INJECTION, SOLUTION INTRAVENOUS at 11:13:00

## 2021-02-10 RX ADMIN — LIDOCAINE HYDROCHLORIDE 50 MG: 10 INJECTION, SOLUTION EPIDURAL; INFILTRATION; INTRACAUDAL; PERINEURAL at 10:55:00

## 2021-02-10 RX ADMIN — SODIUM CHLORIDE, SODIUM LACTATE, POTASSIUM CHLORIDE, CALCIUM CHLORIDE: 600; 310; 30; 20 INJECTION, SOLUTION INTRAVENOUS at 10:52:00

## 2021-02-10 NOTE — OPERATIVE REPORT
ESOPHAGOGASTRODUODENOSCOPY (EGD) REPORT    Isidro RODRIGEZ 1/3/1970 Age 46year old   PCP Chinyere Dietz MD Endoscopist Brooklyn Garcia MD     Date of procedure: 02/10/21    Procedure: EGD w/cold biopsy    Pre-operative diagnosis: Blo sign appreciated. Impression:  1. No mass or ulcer noted. 2. Incidental finding of duodenal nodule, firm on probing. Possible duplication cyst vs other lesion. 3. Suspect bloating/digestive issues may be due to fecal loading/constipation.      Recomme

## 2021-02-10 NOTE — TELEPHONE ENCOUNTER
From: Bushra Cruz  To: John Paul Keys MD  Sent: 2/10/2021 4:46 PM CST  Subject: Other    Hello Dr. Jerod Kitchen and staff, I want to say \"thank you\" for the extraordinary care your whole team provided during a routine procedure like my EGD.  My field

## 2021-02-10 NOTE — ANESTHESIA PREPROCEDURE EVALUATION
Anesthesia PreOp Note    HPI:     Serjio Arevalo is a 46year old female who presents for preoperative consultation requested by: Yesenia Hutson MD    Date of Surgery: 2/10/2021    Procedure(s):  ESOPHAGOGASTRODUODENOSCOPY (EGD)  Indication: Margarita Hence •  lactated ringers infusion, , Intravenous, Continuous, Mega Nielsen MD    No current [facility]-ordered outpatient medications on file.         Radiology Contrast *    ANAPHYLAXIS    Family History   Problem Relation Age of Onset   • Hypertension Fa Not on file      Available pre-op labs reviewed. Vital Signs: Body mass index is 19.37 kg/m². height is 1.676 m (5' 6\") and weight is 54.4 kg (120 lb). Her blood pressure is 120/82 and her pulse is 62.  Her respiration is 20 and oxygen sa

## 2021-02-10 NOTE — ANESTHESIA POSTPROCEDURE EVALUATION
Patient: Unruly Jean    Procedure Summary     Date: 02/10/21 Room / Location: [facility] ENDOSCOPY 01 / [facility] ENDOSCOPY    Anesthesia Start: 9172 Anesthesia Stop: 2230    Procedure: ESOPHAGOGASTRODUODENOSCOPY (EGD) (N/A ) Diagnosis:       Celiac disease

## 2021-02-10 NOTE — TELEPHONE ENCOUNTER
From: Nia Das  To: Georgina Gomes MD  Sent: 2/10/2021 4:54 PM CST  Subject: Non-Urgent Jenn Spore Dr. Padmini Rome,  I hope the year is off to a great start!  I just saw Dr. Mahamed Jamison (gastroenterologist,  in [facility]) and he remin

## 2021-02-10 NOTE — H&P
History & Physical Examination    Patient Name: Tona Forman  MRN: [id number]  [facility]: [id number]  YOB: 1970    Diagnosis: bloating, ab pain, hx of celiac disease      •  Multiple Vitamins-Minerals (MULTIVITAMIN ADULT OR), Take by mouth Gastroenterology  2/10/2021  10:56 AM

## 2021-02-10 NOTE — TELEPHONE ENCOUNTER
We can schedule the DEXA scan here in the office in [facility]. I will place the order. Please call to arrange a time.

## 2021-02-11 ENCOUNTER — TELEPHONE (OUTPATIENT)
Dept: GASTROENTEROLOGY | Facility: CLINIC | Age: 51
End: 2021-02-11

## 2021-02-11 DIAGNOSIS — K31.89 DUODENAL NODULE: Primary | ICD-10-CM

## 2021-02-11 NOTE — TELEPHONE ENCOUNTER
Patient with duodenal nodule, firm on probing. Noted on EGD. Hx of celiac disease, but biopsies show it is well controlled on GFD. D/w patient re:EUS and she would like to proceed.     GI Clinical Staff:   Please call patient for EGD/EUS with Dr. Toy Barnes

## 2021-02-15 ENCOUNTER — HOSPITAL ENCOUNTER (OUTPATIENT)
Dept: BONE DENSITY | Age: 51
Discharge: HOME | End: 2021-02-15
Attending: FAMILY MEDICINE
Payer: COMMERCIAL

## 2021-02-15 ENCOUNTER — TELEPHONE (OUTPATIENT)
Dept: FAMILY MEDICINE CLINIC | Facility: CLINIC | Age: 51
End: 2021-02-15

## 2021-02-15 DIAGNOSIS — Z78.0 POSTMENOPAUSAL: ICD-10-CM

## 2021-02-15 PROCEDURE — 77080 DXA BONE DENSITY AXIAL: CPT | Performed by: FAMILY MEDICINE

## 2021-02-15 NOTE — TELEPHONE ENCOUNTER
----- Message from Alyssa Wang MD sent at 2/15/2021  4:41 PM CST -----  Bone density was compared to the scan that was done in 2018. The technique is slightly different but the results were fairly similar.   Bone density shows osteopenia in the lumba

## 2021-02-19 ENCOUNTER — PATIENT MESSAGE (OUTPATIENT)
Dept: GASTROENTEROLOGY | Facility: CLINIC | Age: 51
End: 2021-02-19

## 2021-02-19 NOTE — TELEPHONE ENCOUNTER
GI Schedulers-    Patient sent MyChart requesting to schedule EGD/EUS ( see orders below) as soon as possible. Advised patient per MyChart it may take 1-2 weeks to be reached out to schedule. Please assist with scheduling. Thank you!

## 2021-02-19 NOTE — TELEPHONE ENCOUNTER
From: Teresa Lewis  To: Celso Vega  Sent: 2/19/2021 2:12 PM CST  Subject: Non-Urgent Medical Question    Cherrie Thomason, per Dr. Osman Estrada instructions from my EGD, the notes given to me upon discharge says a  will be contact

## 2021-02-19 NOTE — TELEPHONE ENCOUNTER
Sent MyChart to message notifying patient that it may take 1-2 weeks to be contacted for scheduling. Sent message to GI schedulers to assist with scheduling promptly.

## 2021-02-20 NOTE — TELEPHONE ENCOUNTER
Scheduled for:  [id number] and EUS w/poss FNA [id number]  Provider Name:  Dr. Elver Mcmahan  Date:  4/12/21  Location:    [facility]  Sedation:  MAC  Time:  8257 (pt is aware to arrive at 1330)   Prep:  Nothing to eat after midnight   Nothing to drink after 1100 the day of

## 2021-04-10 ENCOUNTER — LAB ENCOUNTER (OUTPATIENT)
Dept: LAB | Facility: HOSPITAL | Age: 51
End: 2021-04-10
Attending: INTERNAL MEDICINE
Payer: COMMERCIAL

## 2021-04-10 DIAGNOSIS — Z01.818 PRE-OP TESTING: ICD-10-CM

## 2021-04-12 ENCOUNTER — ANESTHESIA EVENT (OUTPATIENT)
Dept: ENDOSCOPY | Facility: HOSPITAL | Age: 51
End: 2021-04-12
Payer: COMMERCIAL

## 2021-04-12 ENCOUNTER — ANESTHESIA (OUTPATIENT)
Dept: ENDOSCOPY | Facility: HOSPITAL | Age: 51
End: 2021-04-12
Payer: COMMERCIAL

## 2021-04-12 ENCOUNTER — HOSPITAL ENCOUNTER (OUTPATIENT)
Facility: HOSPITAL | Age: 51
Setting detail: HOSPITAL OUTPATIENT SURGERY
Discharge: HOME | End: 2021-04-12
Attending: INTERNAL MEDICINE | Admitting: INTERNAL MEDICINE
Payer: COMMERCIAL

## 2021-04-12 ENCOUNTER — TELEPHONE (OUTPATIENT)
Dept: GASTROENTEROLOGY | Facility: CLINIC | Age: 51
End: 2021-04-12

## 2021-04-12 VITALS
DIASTOLIC BLOOD PRESSURE: 81 MMHG | WEIGHT: 118 LBS | OXYGEN SATURATION: 100 % | HEIGHT: 66 IN | HEART RATE: 77 BPM | BODY MASS INDEX: 18.96 KG/M2 | RESPIRATION RATE: 15 BRPM | TEMPERATURE: 99 F | SYSTOLIC BLOOD PRESSURE: 123 MMHG

## 2021-04-12 DIAGNOSIS — Z01.818 PRE-OP TESTING: Primary | ICD-10-CM

## 2021-04-12 DIAGNOSIS — K31.89 DUODENAL NODULE: ICD-10-CM

## 2021-04-12 PROCEDURE — 0DJ08ZZ INSPECTION OF UPPER INTESTINAL TRACT, VIA NATURAL OR ARTIFICIAL OPENING ENDOSCOPIC: ICD-10-PCS | Performed by: INTERNAL MEDICINE

## 2021-04-12 PROCEDURE — 43259 EGD US EXAM DUODENUM/JEJUNUM: CPT | Performed by: INTERNAL MEDICINE

## 2021-04-12 RX ORDER — GLYCOPYRROLATE 0.2 MG/ML
0.2 INJECTION, SOLUTION INTRAMUSCULAR; INTRAVENOUS ONCE
Status: COMPLETED | OUTPATIENT
Start: 2021-04-12 | End: 2021-04-12

## 2021-04-12 RX ORDER — LIDOCAINE HYDROCHLORIDE 10 MG/ML
INJECTION, SOLUTION EPIDURAL; INFILTRATION; INTRACAUDAL; PERINEURAL AS NEEDED
Status: DISCONTINUED | OUTPATIENT
Start: 2021-04-12 | End: 2021-04-12 | Stop reason: SURG

## 2021-04-12 RX ORDER — SODIUM CHLORIDE, SODIUM LACTATE, POTASSIUM CHLORIDE, CALCIUM CHLORIDE 600; 310; 30; 20 MG/100ML; MG/100ML; MG/100ML; MG/100ML
INJECTION, SOLUTION INTRAVENOUS CONTINUOUS
Status: DISCONTINUED | OUTPATIENT
Start: 2021-04-12 | End: 2021-04-12

## 2021-04-12 RX ORDER — NALOXONE HYDROCHLORIDE 0.4 MG/ML
80 INJECTION, SOLUTION INTRAMUSCULAR; INTRAVENOUS; SUBCUTANEOUS AS NEEDED
Status: DISCONTINUED | OUTPATIENT
Start: 2021-04-12 | End: 2021-04-12

## 2021-04-12 RX ADMIN — SODIUM CHLORIDE, SODIUM LACTATE, POTASSIUM CHLORIDE, CALCIUM CHLORIDE: 600; 310; 30; 20 INJECTION, SOLUTION INTRAVENOUS at 15:11:00

## 2021-04-12 RX ADMIN — LIDOCAINE HYDROCHLORIDE 50 MG: 10 INJECTION, SOLUTION EPIDURAL; INFILTRATION; INTRACAUDAL; PERINEURAL at 15:13:00

## 2021-04-12 RX ADMIN — SODIUM CHLORIDE, SODIUM LACTATE, POTASSIUM CHLORIDE, CALCIUM CHLORIDE: 600; 310; 30; 20 INJECTION, SOLUTION INTRAVENOUS at 15:48:00

## 2021-04-12 NOTE — TELEPHONE ENCOUNTER
GI staff:    Please recall for EGD/EUS for subepithelial duodenal nodule in 1 year    Thanks    Yesy Ornelas MD  [facility], [facility] - Gastroenterology  4/12/2021  4:15 PM

## 2021-04-12 NOTE — OPERATIVE REPORT
Esophagogastroduodenoscopy (EGD) & Endoscopic Ultrasound (EUS) Report    Gael Linares     1/3/1970 Age 46year old   PCP Laura Brantley MD Endoscopist Emory Maldonado MD     Date of procedure: 21    Procedure: EGD and EUS esophagus, withdrawal back into the esophagus after examination of the stomach and small intestine (as outlined) below, there was a small superficial area of blood that then had stopped on its own likely consistent with a small sandro pete tear that occurred due to

## 2021-04-12 NOTE — ANESTHESIA POSTPROCEDURE EVALUATION
Patient: Gael Linares    Procedure Summary     Date: 04/12/21 Room / Location: [facility] ENDOSCOPY 01 / [facility] ENDOSCOPY    Anesthesia Start: 9539 Anesthesia Stop:     Procedures:       ESOPHAGOGASTRODUODENOSCOPY (N/A )      ENDOSCOPIC ULTRASOUND with poss

## 2021-04-12 NOTE — ANESTHESIA PREPROCEDURE EVALUATION
Anesthesia PreOp Note    HPI:     Ulises Graham is a 46year old female who presents for preoperative consultation requested by: Dionna Roper MD    Date of Surgery: 4/12/2021    Procedure(s):  ESOPHAGOGASTRODUODENOSCOPY  ENDOSCOPIC Gayle Other Rfl: , 4/5/2021  Vitamin B-12 1000 MCG Oral Tab, Take 1,000 mcg by mouth daily. , Disp: , Rfl: , 4/5/2021  cholecalciferol (VITAMIN D3) 125 MCG (5000 UT) Oral Cap, Take 5,000 Units by mouth daily. , Disp: , Rfl:       lactated ringers infusion, , Intravenous Family:       Frequency of Social Gatherings with Friends and Family:       Attends [employer] witness Services:       Active Member of Clubs or Organizations:       Attends Club or Organization Meetings:       Marital Status:   Intimate Partner Violence:       Fea

## 2021-04-12 NOTE — TELEPHONE ENCOUNTER
1 year recall for EGD/EUS placed into Pt Outreach. Next due on 4/12/22 per Dr. Jessica Loyd. Specialty comments updated.

## 2021-04-12 NOTE — H&P
History & Physical Examination    Patient Name: Bart Clay  MRN: [id number]  CSN: [id number]  YOB: 1970    Diagnosis: duodenal nodule    Multiple Vitamins-Minerals (MULTIVITAMIN ADULT OR), Take by mouth daily. , Disp: , Rfl: , 4/5/20 above.  Gopi Navas MD  [facility], [facility] - Gastroenterology  4/12/2021  3:11 PM

## 2021-06-16 NOTE — PROGRESS NOTES
HPI:    Patient ID: Ulises Graham is a 46year old female. HPI     Patient is present for follow-up and medication.   She has been on Zoloft in the past.  She did have side effects to the Zoloft including intestinal problems as well as she felt t distress. Appearance: Normal appearance. She is well-developed and normal weight. She is not ill-appearing. HENT:      Head: Normocephalic and atraumatic. Neck:      Thyroid: No thyromegaly.    Cardiovascular:      Rate and Rhythm: Normal rate and r depressive and anxiety concerns.        #[phone]

## 2021-06-19 NOTE — PATIENT INSTRUCTIONS
Start Pristiq daily. Recommend to continue for at least 6 months before considering weaning off again. Recheck in about 1 month. Follow-up sooner if have any problems or side effects to the medication.

## 2021-07-12 RX ORDER — DESVENLAFAXINE 25 MG/1
TABLET, EXTENDED RELEASE ORAL
Qty: 30 TABLET | Refills: 0 | Status: SHIPPED | OUTPATIENT
Start: 2021-07-12 | End: 2021-07-30

## 2021-07-12 NOTE — TELEPHONE ENCOUNTER
One refill done. Patient was to follow up in one month on the medication. Please remind her to follow up. Thank you.

## 2021-07-12 NOTE — TELEPHONE ENCOUNTER
Future appt:    Last Appointment with provider:   6/16/2021 Medication follow up  Last appointment at [facility]:  6/16/2021  Cholesterol, Total (mg/dL)   Date Value   09/08/2020 169     HDL Cholesterol (mg/dL)   Date Value   09/08/2020 91 (H)     LDL Ch

## 2021-07-30 NOTE — PROGRESS NOTES
HPI:    Patient ID: Delfin Catalan is a 46year old female. HPI     Is well with the medication. Is also doing well with counselor. Lot of her stress is dealing with aging parents. Wondering about weaning off.  Discussed waiting until spring - p Skin:     General: Skin is warm and dry. Neurological:      Mental Status: She is alert and oriented to person, place, and time. Deep Tendon Reflexes: Reflexes are normal and symmetric.    Psychiatric:         Mood and Affect: Mood normal.

## 2022-01-12 ENCOUNTER — TELEPHONE (OUTPATIENT)
Dept: GASTROENTEROLOGY | Facility: CLINIC | Age: 52
End: 2022-01-12

## 2022-01-12 NOTE — TELEPHONE ENCOUNTER
Patient outreach message received:    1 year recall for EGD/EUS placed into Pt Outreach. Next due on 4/12/22 per Dr. Freedom Mason year recall for EGD/EUS placed into Pt Outreach. Next due on 4/12/22 per Dr. Rachana Suh    Recall reminder letter mailed out to patient.

## 2022-03-01 ENCOUNTER — TELEPHONE (OUTPATIENT)
Dept: GASTROENTEROLOGY | Facility: CLINIC | Age: 52
End: 2022-03-01

## 2022-03-02 NOTE — TELEPHONE ENCOUNTER
Ok to schedule EGD/EUS with possible FNA with MAC at the [facility] for subepithelial duodenal lesion    Thanks    Carol Laird MD  [facility], [facility] - Gastroenterology  3/2/2022  4:15 PM

## 2022-03-02 NOTE — TELEPHONE ENCOUNTER
Last Procedure, Date, MD: EGD/EUS,  4/12/21, Dr. Ying Guadarrama  Last Diagnosis: subepithelial duodenal nodule   Recalled for (mth/yrs): 1 year  Sedation used previously: MAC   Anticoagulants: n/a  Diabetic Meds: n/a  BP meds(Ace inhibitors/ARB's): n/a  Weight loss meds (phentermine/vyvanse): n/a  Iron supplement (RX/OTC): n/a  Height & Weight/BMI: 5'6\"/114lbs/18.4  Hx of Cardiac/CVA issues/(MI/Stroke): n/a  Devices Pacemaker/Defibrillator/Stents: n/a  Resp. Issues/Oxygen Use/LALA/COPD: n/a  Issues w/Anesthesia: n/a     Symptoms (Y/N): N  Symptoms Details: n/a     Special comments/notes: verified allergies, medications, pharmacy. Please advise on orders and prep, thank you.

## 2022-03-15 NOTE — TELEPHONE ENCOUNTER
LMTCB    Can transfer to [facility] in  at [id number] today only, 3/15/22, until 4:15 pm.

## 2022-03-16 NOTE — TELEPHONE ENCOUNTER
Pt is returning the call. Gi  not available.  Pt is requesting a call after 1pm if possible please call

## 2022-03-17 NOTE — TELEPHONE ENCOUNTER
Scheduled for:  EGD 53493 and EUS w/poss FNA [id number]  Provider Name:  Dr. Kelton Rubio  Date:  5/23/22  Location:    [facility]  Sedation:  MAC  Time:  1000 (pt is aware to arrive at 0900)   Prep:  NPO after midnight, sent via whoactually on 3/17/22  Meds/Allergies Reconciled?:  Physician reviewed   Diagnosis with codes:  Subepithelial duodenal lesion K31.9  Was patient informed to call insurance with codes (Y/N):  Yes, I confirmed BCBS PPO insurance with the patient. Referral sent?:  Referral was sent at the time of electronic surgical scheduling. [facility] or [address] notified?:  I sent an electronic request to Endo Scheduling and received a confirmation today. Medication Orders: This patient verbally confirmed that she is not taking:   Iron, blood thinners, BP meds, and is not diabetic   Not latex allergy, Not PCN allergy and does not have a pacemaker   This patient is aware to HOLD all supplements x 7 days before the procedure. Misc Orders:       Further instructions given by staff:     This patient had no questions regarding the prep instructions

## 2022-05-18 NOTE — PAT NURSING NOTE
Contacted pt for PAT for procedure on 5/23. PT lives in [place], [place], and asked to do pre-procedure covid testing at [facility]. Per Saray Mckay at [facility], they only do employees and do not do pre-op testing. Contacted Anthonette Cabot who gave verbal okay for pt to get her pre-procedure covid test at [facility]. Tejinder Elbert stated she will contact lab with pt's information and give them the okay. Explained situation to pt and she was very grateful. Pt verbalized understanding covid test will need to be on Friday and that I was unable to schedule an appt. She stated she would go at 9am. Covid order placed.

## 2022-05-20 ENCOUNTER — LABORATORY ENCOUNTER (OUTPATIENT)
Dept: LAB | Age: 52
End: 2022-05-20
Attending: INTERNAL MEDICINE
Payer: COMMERCIAL

## 2022-05-20 DIAGNOSIS — Z01.812 ENCOUNTER FOR PREOPERATIVE SCREENING LABORATORY TESTING FOR COVID-19 VIRUS: ICD-10-CM

## 2022-05-20 DIAGNOSIS — Z20.822 ENCOUNTER FOR PREOPERATIVE SCREENING LABORATORY TESTING FOR COVID-19 VIRUS: ICD-10-CM

## 2022-05-21 LAB — SARS-COV-2 RNA RESP QL NAA+PROBE: NOT DETECTED

## 2022-05-23 ENCOUNTER — HOSPITAL ENCOUNTER (OUTPATIENT)
Facility: HOSPITAL | Age: 52
Setting detail: HOSPITAL OUTPATIENT SURGERY
Discharge: HOME | End: 2022-05-23
Attending: INTERNAL MEDICINE | Admitting: INTERNAL MEDICINE
Payer: COMMERCIAL

## 2022-05-23 ENCOUNTER — ANESTHESIA (OUTPATIENT)
Dept: ENDOSCOPY | Facility: HOSPITAL | Age: 52
End: 2022-05-23
Payer: COMMERCIAL

## 2022-05-23 ENCOUNTER — ANESTHESIA EVENT (OUTPATIENT)
Dept: ENDOSCOPY | Facility: HOSPITAL | Age: 52
End: 2022-05-23
Payer: COMMERCIAL

## 2022-05-23 VITALS
RESPIRATION RATE: 17 BRPM | BODY MASS INDEX: 16.88 KG/M2 | TEMPERATURE: 99 F | WEIGHT: 105 LBS | DIASTOLIC BLOOD PRESSURE: 69 MMHG | OXYGEN SATURATION: 100 % | HEART RATE: 74 BPM | SYSTOLIC BLOOD PRESSURE: 97 MMHG | HEIGHT: 66 IN

## 2022-05-23 DIAGNOSIS — Z01.812 ENCOUNTER FOR PREOPERATIVE SCREENING LABORATORY TESTING FOR COVID-19 VIRUS: Primary | ICD-10-CM

## 2022-05-23 DIAGNOSIS — K31.9 SUBEPITHELIAL LESION OF DUODENUM: ICD-10-CM

## 2022-05-23 DIAGNOSIS — Z20.822 ENCOUNTER FOR PREOPERATIVE SCREENING LABORATORY TESTING FOR COVID-19 VIRUS: Primary | ICD-10-CM

## 2022-05-23 PROCEDURE — BD47ZZZ ULTRASONOGRAPHY OF GASTROINTESTINAL TRACT: ICD-10-PCS | Performed by: INTERNAL MEDICINE

## 2022-05-23 PROCEDURE — 0DB18ZX EXCISION OF UPPER ESOPHAGUS, VIA NATURAL OR ARTIFICIAL OPENING ENDOSCOPIC, DIAGNOSTIC: ICD-10-PCS | Performed by: INTERNAL MEDICINE

## 2022-05-23 PROCEDURE — 0DJ08ZZ INSPECTION OF UPPER INTESTINAL TRACT, VIA NATURAL OR ARTIFICIAL OPENING ENDOSCOPIC: ICD-10-PCS | Performed by: INTERNAL MEDICINE

## 2022-05-23 PROCEDURE — 43259 EGD US EXAM DUODENUM/JEJUNUM: CPT | Performed by: INTERNAL MEDICINE

## 2022-05-23 PROCEDURE — 0DB28ZX EXCISION OF MIDDLE ESOPHAGUS, VIA NATURAL OR ARTIFICIAL OPENING ENDOSCOPIC, DIAGNOSTIC: ICD-10-PCS | Performed by: INTERNAL MEDICINE

## 2022-05-23 PROCEDURE — 43239 EGD BIOPSY SINGLE/MULTIPLE: CPT | Performed by: INTERNAL MEDICINE

## 2022-05-23 RX ORDER — SODIUM CHLORIDE, SODIUM LACTATE, POTASSIUM CHLORIDE, CALCIUM CHLORIDE 600; 310; 30; 20 MG/100ML; MG/100ML; MG/100ML; MG/100ML
INJECTION, SOLUTION INTRAVENOUS CONTINUOUS
Status: DISCONTINUED | OUTPATIENT
Start: 2022-05-23 | End: 2022-05-23

## 2022-05-23 RX ORDER — GLYCOPYRROLATE 0.2 MG/ML
INJECTION, SOLUTION INTRAMUSCULAR; INTRAVENOUS AS NEEDED
Status: DISCONTINUED | OUTPATIENT
Start: 2022-05-23 | End: 2022-05-23 | Stop reason: SURG

## 2022-05-23 RX ADMIN — GLYCOPYRROLATE 0.2 MG: 0.2 INJECTION, SOLUTION INTRAMUSCULAR; INTRAVENOUS at 10:55:00

## 2022-05-23 NOTE — ANESTHESIA POSTPROCEDURE EVALUATION
Patient: Shelley Neff    Procedure Summary     Date: 05/23/22 Room / Location: [facility] ENDOSCOPY 01 / [facility] ENDOSCOPY    Anesthesia Start: 1054 Anesthesia Stop: 1132    Procedures:       ESOPHAGOGASTRODUODENOSCOPY /ENDOSCOPIC ULTRASOUND  with possible fine needle aspiration (N/A )      ENDOSCOPIC ULTRASOUND  with possible fine needle aspiration (N/A ) Diagnosis:       Subepithelial lesion of duodenum      (duodenal nodule, Subepithelial lesion of duodenum)    Surgeons: Aden Durbin MD Anesthesiologist: Jose De Jesus Umaña CRNA    Anesthesia Type: general, MAC ASA Status: 2          Anesthesia Type: general, MAC    Vitals Value Taken Time   BP 94/64 05/23/22 1132   Temp 98.6 05/23/22 1132   Pulse 80 05/23/22 1132   Resp 17 05/23/22 1132   SpO2 99 05/23/22 1132       [facility] AN Post Evaluation:   Patient Evaluated in PACU  Patient Participation: complete - patient participated  Level of Consciousness: sleepy but conscious  Pain Score: 0  Pain Management: adequate  Airway Patency:patent  Dental exam unchanged from preop  Yes    Cardiovascular Status: acceptable  Respiratory Status: acceptable and room air  Postoperative Hydration acceptable      Elaine Kay CRNA  5/23/2022 11:32 AM

## 2023-07-17 ENCOUNTER — LAB ENCOUNTER (OUTPATIENT)
Dept: LAB | Age: 53
End: 2023-07-17
Attending: NURSE PRACTITIONER
Payer: COMMERCIAL

## 2023-07-17 ENCOUNTER — OFFICE VISIT (OUTPATIENT)
Dept: FAMILY MEDICINE CLINIC | Facility: CLINIC | Age: 53
End: 2023-07-17
Payer: COMMERCIAL

## 2023-07-17 VITALS
RESPIRATION RATE: 16 BRPM | OXYGEN SATURATION: 99 % | SYSTOLIC BLOOD PRESSURE: 102 MMHG | TEMPERATURE: 99 F | HEART RATE: 65 BPM | WEIGHT: 116 LBS | BODY MASS INDEX: 18.64 KG/M2 | HEIGHT: 66 IN | DIASTOLIC BLOOD PRESSURE: 68 MMHG

## 2023-07-17 DIAGNOSIS — R50.9 FEVER, UNSPECIFIED FEVER CAUSE: ICD-10-CM

## 2023-07-17 DIAGNOSIS — R74.8 ELEVATED LIVER ENZYMES: ICD-10-CM

## 2023-07-17 DIAGNOSIS — K90.0 CELIAC DISEASE: ICD-10-CM

## 2023-07-17 DIAGNOSIS — N95.0 POSTMENOPAUSAL VAGINAL BLEEDING: ICD-10-CM

## 2023-07-17 DIAGNOSIS — N95.0 POSTMENOPAUSAL VAGINAL BLEEDING: Primary | ICD-10-CM

## 2023-07-17 LAB
ALBUMIN SERPL-MCNC: 3.8 G/DL (ref 3.4–5)
ALBUMIN/GLOB SERPL: 1 {RATIO} (ref 1–2)
ALP LIVER SERPL-CCNC: 110 U/L
ALT SERPL-CCNC: 328 U/L
ANION GAP SERPL CALC-SCNC: 6 MMOL/L (ref 0–18)
AST SERPL-CCNC: 230 U/L (ref 15–37)
BASOPHILS # BLD AUTO: 0.03 X10(3) UL (ref 0–0.2)
BASOPHILS NFR BLD AUTO: 0.6 %
BILIRUB SERPL-MCNC: 0.3 MG/DL (ref 0.1–2)
BUN BLD-MCNC: 16 MG/DL (ref 7–18)
CALCIUM BLD-MCNC: 9.8 MG/DL (ref 8.5–10.1)
CHLORIDE SERPL-SCNC: 108 MMOL/L (ref 98–112)
CO2 SERPL-SCNC: 26 MMOL/L (ref 21–32)
CREAT BLD-MCNC: 1.03 MG/DL
EOSINOPHIL # BLD AUTO: 0.18 X10(3) UL (ref 0–0.7)
EOSINOPHIL NFR BLD AUTO: 3.8 %
ERYTHROCYTE [DISTWIDTH] IN BLOOD BY AUTOMATED COUNT: 13.1 %
GFR SERPLBLD BASED ON 1.73 SQ M-ARVRAT: 64 ML/MIN/1.73M2 (ref 60–?)
GLOBULIN PLAS-MCNC: 3.8 G/DL (ref 2.8–4.4)
GLUCOSE BLD-MCNC: 86 MG/DL (ref 70–99)
HCT VFR BLD AUTO: 42.2 %
HGB BLD-MCNC: 13.9 G/DL
IMM GRANULOCYTES # BLD AUTO: 0.01 X10(3) UL (ref 0–1)
IMM GRANULOCYTES NFR BLD: 0.2 %
LYMPHOCYTES # BLD AUTO: 1.39 X10(3) UL (ref 1–4)
LYMPHOCYTES NFR BLD AUTO: 29.1 %
MCH RBC QN AUTO: 31.6 PG (ref 26–34)
MCHC RBC AUTO-ENTMCNC: 32.9 G/DL (ref 31–37)
MCV RBC AUTO: 95.9 FL
MONOCYTES # BLD AUTO: 0.57 X10(3) UL (ref 0.1–1)
MONOCYTES NFR BLD AUTO: 11.9 %
NEUTROPHILS # BLD AUTO: 2.59 X10 (3) UL (ref 1.5–7.7)
NEUTROPHILS # BLD AUTO: 2.59 X10(3) UL (ref 1.5–7.7)
NEUTROPHILS NFR BLD AUTO: 54.4 %
OSMOLALITY SERPL CALC.SUM OF ELEC: 290 MOSM/KG (ref 275–295)
PLATELET # BLD AUTO: 239 10(3)UL (ref 150–450)
POTASSIUM SERPL-SCNC: 4 MMOL/L (ref 3.5–5.1)
PROT SERPL-MCNC: 7.6 G/DL (ref 6.4–8.2)
RBC # BLD AUTO: 4.4 X10(6)UL
SODIUM SERPL-SCNC: 140 MMOL/L (ref 136–145)
VIT B12 SERPL-MCNC: 1397 PG/ML (ref 193–986)
VIT D+METAB SERPL-MCNC: 25.1 NG/ML (ref 30–100)
WBC # BLD AUTO: 4.8 X10(3) UL (ref 4–11)

## 2023-07-17 PROCEDURE — 87205 SMEAR GRAM STAIN: CPT | Performed by: NURSE PRACTITIONER

## 2023-07-17 PROCEDURE — 87186 SC STD MICRODIL/AGAR DIL: CPT | Performed by: NURSE PRACTITIONER

## 2023-07-17 PROCEDURE — 87510 GARDNER VAG DNA DIR PROBE: CPT | Performed by: NURSE PRACTITIONER

## 2023-07-17 PROCEDURE — 87070 CULTURE OTHR SPECIMN AEROBIC: CPT | Performed by: NURSE PRACTITIONER

## 2023-07-17 PROCEDURE — 82306 VITAMIN D 25 HYDROXY: CPT

## 2023-07-17 PROCEDURE — 80053 COMPREHEN METABOLIC PANEL: CPT

## 2023-07-17 PROCEDURE — 87660 TRICHOMONAS VAGIN DIR PROBE: CPT | Performed by: NURSE PRACTITIONER

## 2023-07-17 PROCEDURE — 36415 COLL VENOUS BLD VENIPUNCTURE: CPT

## 2023-07-17 PROCEDURE — 80074 ACUTE HEPATITIS PANEL: CPT

## 2023-07-17 PROCEDURE — 85025 COMPLETE CBC W/AUTO DIFF WBC: CPT

## 2023-07-17 PROCEDURE — 82607 VITAMIN B-12: CPT

## 2023-07-17 PROCEDURE — 87480 CANDIDA DNA DIR PROBE: CPT | Performed by: NURSE PRACTITIONER

## 2023-07-17 RX ORDER — KETOROLAC TROMETHAMINE 10 MG/1
1 TABLET, FILM COATED ORAL EVERY 6 HOURS PRN
COMMUNITY
Start: 2023-07-15

## 2023-07-17 RX ORDER — AMOXICILLIN AND CLAVULANATE POTASSIUM 875; 125 MG/1; MG/1
1 TABLET, FILM COATED ORAL 2 TIMES DAILY
Qty: 14 TABLET | Refills: 0 | COMMUNITY
Start: 2023-07-15 | End: 2023-07-22

## 2023-07-18 ENCOUNTER — TELEPHONE (OUTPATIENT)
Dept: FAMILY MEDICINE CLINIC | Facility: CLINIC | Age: 53
End: 2023-07-18

## 2023-07-18 DIAGNOSIS — R50.9 FEVER, UNSPECIFIED FEVER CAUSE: ICD-10-CM

## 2023-07-18 DIAGNOSIS — R74.8 ELEVATED LIVER ENZYMES: Primary | ICD-10-CM

## 2023-07-18 LAB
HAV IGM SER QL: NONREACTIVE
HBV CORE IGM SER QL: NONREACTIVE
HBV SURFACE AG SERPL QL IA: NONREACTIVE
HCV AB SERPL QL IA: NONREACTIVE

## 2023-07-18 NOTE — TELEPHONE ENCOUNTER
----- Message from MATT Haq sent at 7/18/2023  8:42 AM CDT -----  Blood work shows very elevated liver enzymes. Added a hepatitis profile to the available blood. Vitamin B12 is elevated. Take supplement every other day. Vitamin D is low. Increase supplementation by 1000 units daily. Note to MyChart.

## 2023-07-18 NOTE — TELEPHONE ENCOUNTER
----- Message from MATT Edwards sent at 7/18/2023  7:57 AM CDT -----  Vaginal swab is negative for BV and  yeast. Culture still pending. Note to MyChart.

## 2023-07-18 NOTE — TELEPHONE ENCOUNTER
Spoke with patient regarding lab results. No questions at this time. Scheduled follow up appt.    Future Appointments   Date Time Provider Cheryl Durant   7/20/2023  2:45 PM Danny Hayden APRN [facility]

## 2023-07-18 NOTE — TELEPHONE ENCOUNTER
----- Message from MATT Galeano sent at 7/18/2023 10:36 AM CDT -----  Lab is negative for hepatitis. Recommend follow up appointment to discuss other possible labs and testing needed. Note to Jodit. Possibly Thursday at 2:45?

## 2023-07-20 ENCOUNTER — OFFICE VISIT (OUTPATIENT)
Dept: FAMILY MEDICINE CLINIC | Facility: CLINIC | Age: 53
End: 2023-07-20
Payer: COMMERCIAL

## 2023-07-20 VITALS
SYSTOLIC BLOOD PRESSURE: 108 MMHG | OXYGEN SATURATION: 97 % | WEIGHT: 115 LBS | HEART RATE: 68 BPM | TEMPERATURE: 98 F | DIASTOLIC BLOOD PRESSURE: 72 MMHG | BODY MASS INDEX: 18.48 KG/M2 | RESPIRATION RATE: 16 BRPM | HEIGHT: 66 IN

## 2023-07-20 DIAGNOSIS — N76.0 VAGINAL INFECTION: ICD-10-CM

## 2023-07-20 DIAGNOSIS — R50.9 FEVER, UNSPECIFIED FEVER CAUSE: ICD-10-CM

## 2023-07-20 DIAGNOSIS — R74.8 ELEVATED LIVER ENZYMES: ICD-10-CM

## 2023-07-20 DIAGNOSIS — R51.9 FREQUENT HEADACHES: Primary | ICD-10-CM

## 2023-07-20 PROCEDURE — 99214 OFFICE O/P EST MOD 30 MIN: CPT | Performed by: NURSE PRACTITIONER

## 2023-07-20 PROCEDURE — 3008F BODY MASS INDEX DOCD: CPT | Performed by: NURSE PRACTITIONER

## 2023-07-20 PROCEDURE — 3078F DIAST BP <80 MM HG: CPT | Performed by: NURSE PRACTITIONER

## 2023-07-20 PROCEDURE — 3074F SYST BP LT 130 MM HG: CPT | Performed by: NURSE PRACTITIONER

## 2023-07-20 RX ORDER — LEVOFLOXACIN 750 MG/1
750 TABLET ORAL DAILY
Qty: 3 TABLET | Refills: 0 | Status: SHIPPED | OUTPATIENT
Start: 2023-07-20 | End: 2023-07-23

## 2023-07-20 RX ORDER — GARLIC EXTRACT 500 MG
1 CAPSULE ORAL DAILY
COMMUNITY

## 2023-08-03 ENCOUNTER — LABORATORY ENCOUNTER (OUTPATIENT)
Dept: LAB | Age: 53
End: 2023-08-03
Attending: FAMILY MEDICINE
Payer: COMMERCIAL

## 2023-08-03 DIAGNOSIS — R74.8 ELEVATED LIVER ENZYMES: ICD-10-CM

## 2023-08-03 DIAGNOSIS — R51.9 FREQUENT HEADACHES: ICD-10-CM

## 2023-08-03 LAB
ALBUMIN SERPL-MCNC: 4 G/DL (ref 3.4–5)
ALBUMIN/GLOB SERPL: 1.3 {RATIO} (ref 1–2)
ALP LIVER SERPL-CCNC: 76 U/L
ALT SERPL-CCNC: 28 U/L
ANION GAP SERPL CALC-SCNC: 3 MMOL/L (ref 0–18)
AST SERPL-CCNC: 18 U/L (ref 15–37)
BILIRUB SERPL-MCNC: 0.5 MG/DL (ref 0.1–2)
BUN BLD-MCNC: 25 MG/DL (ref 7–18)
CALCIUM BLD-MCNC: 9.9 MG/DL (ref 8.5–10.1)
CHLORIDE SERPL-SCNC: 107 MMOL/L (ref 98–112)
CO2 SERPL-SCNC: 32 MMOL/L (ref 21–32)
CREAT BLD-MCNC: 1.39 MG/DL
EGFRCR SERPLBLD CKD-EPI 2021: 45 ML/MIN/1.73M2 (ref 60–?)
FASTING STATUS PATIENT QL REPORTED: NO
GLOBULIN PLAS-MCNC: 3.2 G/DL (ref 2.8–4.4)
GLUCOSE BLD-MCNC: 71 MG/DL (ref 70–99)
OSMOLALITY SERPL CALC.SUM OF ELEC: 297 MOSM/KG (ref 275–295)
POTASSIUM SERPL-SCNC: 4.1 MMOL/L (ref 3.5–5.1)
PROT SERPL-MCNC: 7.2 G/DL (ref 6.4–8.2)
SODIUM SERPL-SCNC: 142 MMOL/L (ref 136–145)

## 2023-08-03 PROCEDURE — 80053 COMPREHEN METABOLIC PANEL: CPT

## 2023-08-03 PROCEDURE — 36415 COLL VENOUS BLD VENIPUNCTURE: CPT

## 2023-08-03 NOTE — PATIENT INSTRUCTIONS
Lab pending  Directed to take antibiotics until gone. Recommend to eat yogurt or take probiotic daily while on antibiotic, but not the same time as the antibiotic. Treat symptoms as needed. Return to clinic if not better in 48-72 hours. Otherwise follow-up as needed.

## 2023-08-06 ENCOUNTER — PATIENT MESSAGE (OUTPATIENT)
Dept: FAMILY MEDICINE CLINIC | Facility: CLINIC | Age: 53
End: 2023-08-06

## 2023-08-06 DIAGNOSIS — N28.9 DECREASED RENAL FUNCTION: Primary | ICD-10-CM

## 2023-08-07 NOTE — TELEPHONE ENCOUNTER
From: Regan Martinez  To: MATT Tolliver  Sent: 8/6/2023 2:44 PM CDT  Subject: Follow Up    Cherrie Torres, I received your note regarding my kidneys. Should I be worried. I'm feeling better-no headache or fever, just tired. I already thought I was drinking enough water, but apparently not. At what point do I become concerned?

## 2023-08-21 ENCOUNTER — LABORATORY ENCOUNTER (OUTPATIENT)
Dept: LAB | Age: 53
End: 2023-08-21
Attending: FAMILY MEDICINE
Payer: COMMERCIAL

## 2023-08-21 DIAGNOSIS — N28.9 DECREASED RENAL FUNCTION: ICD-10-CM

## 2023-08-21 LAB
ALBUMIN SERPL-MCNC: 4 G/DL (ref 3.4–5)
ALBUMIN/GLOB SERPL: 1.3 {RATIO} (ref 1–2)
ALP LIVER SERPL-CCNC: 67 U/L
ALT SERPL-CCNC: 34 U/L
ANION GAP SERPL CALC-SCNC: 4 MMOL/L (ref 0–18)
AST SERPL-CCNC: 21 U/L (ref 15–37)
BILIRUB SERPL-MCNC: 0.7 MG/DL (ref 0.1–2)
BUN BLD-MCNC: 20 MG/DL (ref 7–18)
CALCIUM BLD-MCNC: 9.5 MG/DL (ref 8.5–10.1)
CHLORIDE SERPL-SCNC: 105 MMOL/L (ref 98–112)
CO2 SERPL-SCNC: 29 MMOL/L (ref 21–32)
CREAT BLD-MCNC: 1.17 MG/DL
EGFRCR SERPLBLD CKD-EPI 2021: 55 ML/MIN/1.73M2 (ref 60–?)
FASTING STATUS PATIENT QL REPORTED: NO
GLOBULIN PLAS-MCNC: 3.2 G/DL (ref 2.8–4.4)
GLUCOSE BLD-MCNC: 98 MG/DL (ref 70–99)
OSMOLALITY SERPL CALC.SUM OF ELEC: 289 MOSM/KG (ref 275–295)
POTASSIUM SERPL-SCNC: 3.9 MMOL/L (ref 3.5–5.1)
PROT SERPL-MCNC: 7.2 G/DL (ref 6.4–8.2)
SODIUM SERPL-SCNC: 138 MMOL/L (ref 136–145)

## 2023-08-21 PROCEDURE — 36415 COLL VENOUS BLD VENIPUNCTURE: CPT

## 2023-08-21 PROCEDURE — 80053 COMPREHEN METABOLIC PANEL: CPT

## 2024-03-21 ENCOUNTER — TELEPHONE (OUTPATIENT)
Facility: CLINIC | Age: 54
End: 2024-03-21

## 2024-03-21 ENCOUNTER — OFFICE VISIT (OUTPATIENT)
Facility: CLINIC | Age: 54
End: 2024-03-21

## 2024-03-21 VITALS
HEIGHT: 66 IN | HEART RATE: 61 BPM | SYSTOLIC BLOOD PRESSURE: 130 MMHG | WEIGHT: 118 LBS | DIASTOLIC BLOOD PRESSURE: 87 MMHG | BODY MASS INDEX: 18.96 KG/M2

## 2024-03-21 DIAGNOSIS — Z12.11 COLON CANCER SCREENING: Primary | ICD-10-CM

## 2024-03-21 DIAGNOSIS — K52.9 CHRONIC DIARRHEA: Primary | ICD-10-CM

## 2024-03-21 DIAGNOSIS — K57.92 DIVERTICULITIS: ICD-10-CM

## 2024-03-21 RX ORDER — SOD SULF/POT CHLORIDE/MAG SULF 1.479 G
1 TABLET ORAL AS DIRECTED
Qty: 24 TABLET | Refills: 0 | Status: SHIPPED | OUTPATIENT
Start: 2024-03-21 | End: 2024-03-22

## 2024-03-21 RX ORDER — SODIUM, POTASSIUM,MAG SULFATES 17.5-3.13G
SOLUTION, RECONSTITUTED, ORAL ORAL
Qty: 1 EACH | Refills: 0 | Status: SHIPPED | OUTPATIENT
Start: 2024-03-21 | End: 2024-03-21

## 2024-03-21 NOTE — PATIENT INSTRUCTIONS
1. Schedule colonoscopy with MAC [Diagnosis: screening]    2.  bowel prep from pharmacy ([facility])    3. Continue all medications as normal for your procedure.    4. Read all bowel prep instructions carefully. Bowel prep instructions can also be found online at:  www.[facility].org/giprep     5. AVOID seeds, nuts, popcorn, raw fruits and vegetables for 3 days before procedure    6. You MAY need to go for COVID testing 72 hours before procedure. The testing team will call you a few days before your procedure to discuss with you if testing is required. If you are asked to go for COVID testing and do not completed the test, the procedure cannot be performed.     7. If you start any NEW medication after your visit today, please notify us. Certain medications (like iron or weight loss medications) will need to be held before the procedure, or the procedure cannot be performed safely.      8. High fiber diet    9. ADD as needed IBgard for IBS-diarrhea symptoms

## 2024-03-21 NOTE — TELEPHONE ENCOUNTER
Scheduled for:  Colonoscopy 66002     Location:  [facility] ([facility]  Provider:  Dr Pickard    Date:  7/15/2024  Time:   9:45AM  (Patient made aware [facility] will call the day before with an arrival time)    Sedation:  MAC  Prep:  Split Sutab    Diagnosis with codes:  Colon screen Z12.11       Meds/Allergies Reconciled?:   Provider Reviewed   Was patient informed to call insurance with codes (Y/N):  Yes  Referral sent?: Referral was sent at the time of electronic surgical scheduling.  EM or [facility] notified?: I sent an electronic request to [facility] Scheduling and received a confirmation today.     Medication Orders:  Patient is aware to NOT take iron pills, herbal meds and diet supplements for 7 days before exam. Also to NOT take any form of alcohol, recreational drugs and any forms of ED meds 24 hours before exam.       Misc Orders:  N/A   Further instructions given by staff:  I Provided prep instructions to patient and reviewed date, time and location. Patient verbalized that  She / Her understood and is aware to call with any questions.  Patient was informed about the new cancellation policy for She / Her procedure. Patient was also given copy of the cancellation policy at the time of the appointment and verbalized understanding.

## 2024-03-21 NOTE — PROGRESS NOTES
[facility] - Gastroenterology                                                                                                               Reason for consult: f/u    Requesting physician or provider: Yoseph Sawant MD    Chief Complaint   Patient presents with    Follow - Up     Diarrhea        HPI:   Estela Rose is a 54 year old year-old female with history of celiac disease (remote dx), lymphocytic colitis (2016, on periodic busesonide), IBS-D, anxiety/stress, duodenal cyst (prior EUS shows benign findnigs), hx of diverticulitis who presents for f/u.    Since last visit:  -high fever last August 2023, went to ER  -high LFTs last July and then LFTs improved, unclear etiology  -CT at OSH showed diverticulitis--->took abx and felt better  -seeing a functional medicine doctor, started digestive enzymes- unclear how helpful it is  -Patient also on align probiotic supplementation  - She has significant social stressors including her mom who is quite sick as well as her sister, she is trying to handle her family's health and finances  - She knows when she has a significant stressful event the next day she tends to have altered bowel habits and frequent stools and diarrhea  - She does use Imodium on occasion but does not have any significant constipation issues  - She is on a plant-based diet which she has been taking consistently  - No NSAID use  - No chest pain shortness of breath  - No melena hematochezia  - No odynophagia or dysphagia      NSAIDS: no  Tobacco: no  Alcohol: no  Illicit drugs: no     No FH GI malignancy     From GI attending MD note 5/21/18:  Prior endoscopies:  8/2016 EGD/C-scope ([facility])  -diverticulosis of sigmoid  -int/ext hemorrhoids  -normal ileum  -good prep  -normal appearing colon  -EGD: esophagitis at GE junction, gastric erythema, mild duodenitis --->plan avoid NSAIDS/and  start omep 20/day.      2016 [facility]  -duodenal biopsy: small bowel wnl, no celiac disease  -gastric biopsies: wnl, neg Hpylori  -distal esophagus: neg for taylor's, wnl  -terminal ileum: wnl  -colon random: positive for lymphocytic colitis, negative for collagenous colitis    Wt Readings from Last 6 Encounters:   24 118 lb (53.5 kg)   23 115 lb (52.2 kg)   23 116 lb (52.6 kg)   22 105 lb (47.6 kg)   21 120 lb (54.4 kg)   21 118 lb 3.2 oz (53.6 kg)        History, Medications, Allergies, ROS:      Past Medical History:   Diagnosis Date    Celiac disease (HCC)     Lymphocytic colitis     Osteopenia     Screen for colon cancer     No polyps, repeat in       Past Surgical History:   Procedure Laterality Date    COLONOSCOPY      EGD      LUMPECTOMY LEFT      2 seperate surgeries          OTHER SURGICAL HISTORY      Left Breast Bx    REDUCTION LEFT  2018    REDUCTION RIGHT  2018    TONSILLECTOMY        Family Hx:   Family History   Problem Relation Age of Onset    Hypertension Father     Lipids Father     Cancer Mother       Social History:   Social History     Socioeconomic History    Marital status:    Tobacco Use    Smoking status: Never    Smokeless tobacco: Never   Vaping Use    Vaping Use: Never used   Substance and Sexual Activity    Alcohol use: Not Currently     Comment: 1 Drink every 4-6 months--Rare    Drug use: No    Sexual activity: Yes   Other Topics Concern    Caffeine Concern No    Exercise Yes     Comment: Walk    Seat Belt Yes    Special Diet Yes     Comment: Gluten Intolerant    Stress Concern Yes     Comment: Health Concerns/ Bowels    Weight Concern No        Medications (Active prior to today's visit):  Current Outpatient Medications   Medication Sig Dispense Refill    Sodium Sulfate-Mag Sulfate-KCl (SUTAB) 5500-793-889 MG Oral Tab Take 1 Package by mouth As Directed for 1 day. Take as directed by GI clinic. 24 tablet 0     acidophilus-pectin Oral Cap Take 1 capsule by mouth daily.      St Johns Wort 150 MG Oral Cap Take by mouth.      Multiple Vitamins-Minerals (MULTIVITAMIN ADULT OR) Take by mouth daily.      Calcium Carb-Cholecalciferol (CALCIUM + D3) 600-200 MG-UNIT Oral Tab Take 1 tablet by mouth daily.        Vitamin B-12 1000 MCG Oral Tab Take 1 tablet (1,000 mcg total) by mouth daily.         Allergies:  Allergies   Allergen Reactions    Radiology Contrast Iodinated Dyes ANAPHYLAXIS       ROS:   CONSTITUTIONAL: negative for fevers, chills, sweats and weight loss  EYES Negative for red eyes, yellow eyes, changes in vision  HEENT: Negative for dysphagia and hoarseness  RESPIRATORY: Negative for cough and shortness of breath  CARDIOVASCULAR: Negative for chest pain, palpitations  GASTROINTESTINAL: See HPI  GENITOURINARY: Negative for dysuria and frequency  MUSCULOSKELETAL: Negative for arthralgias and myalgias  NEUROLOGICAL: Negative for dizziness and headaches  BEHAVIOR/PSYCH: Negative for anxiety and poor appetite    PHYSICAL EXAM:   Blood pressure 130/87, pulse 61, height 5' 6\" (1.676 m), weight 118 lb (53.5 kg), not currently breastfeeding.    GEN: WD/WN, NAD  HEENT: Supple symmetrical, trachea midline  CV: RRR, the extremities are warm and well perfused   LUNGS: No increased work of breathing  ABDOMEN: No scars, normal bowel sounds, soft, non-tender, non-distended no rebound or guarding, no masses, no hepatomegaly  MSK: No redness, no warmth, no swelling of joints  SKIN: No jaundice, no erythema, no rashes  HEMATOLOGIC: No bleeding, no bruising  NEURO: Alert and interactive, normal gait    Labs/Imaging/Procedures:   CT A/P 9/5/20:  CONCLUSION:  Extensive stool throughout the colon with mild colonic wall thickening and pericolonic soft tissue stranding at the level of the mid descending colon in an area of diverticula suspicious for diverticulitis.  No free air or adjacent free   fluid.     Normal caliber appendix.     Grade  1-2 spondylolisthesis at L5-S1 with bilateral spondylolysis.     Component   Ref Range & Units 9/21/20  1:29 PM   Immunoglobulin A   70.00 - 312.00 mg/dL 126.00      Component   Ref Range & Units 9/21/20  1:29 PM   Tissue Transglutaminase IgA Ab   <7.0 U/mL 0.5      Component   Ref Range & Units 9/21/20  1:29 PM   Vitamin A (Retinol)   0.30 - 1.20 mg/L 0.64    Retinyl Palmitate   0.00 - 0.10 mg/L <0.02    Interpretation Vit A, Serum/Plasma  Normal      Component   Ref Range & Units 9/21/20  1:29 PM   Vitamin D, 25OH, Total   30.0 - 100.0 ng/mL 54.6      Ref Range & Units 9/21/20  1:29 PM   Alpha-Tocopherol (Vit E) -mg/L   5.5 - 18.0 mg/L 10.4    Comment:    Test developed and characteristics determined by [facility]. See Compliance Statement B: [email or web address]   Gamma-Tocopherol (Vit E) -mg/L   0.0 - 6.0 mg/L 1.0      Component   Ref Range & Units 9/21/20  1:29 PM   Vitamin B12   193 - 986 pg/mL >2,000High       Component   Ref Range & Units 9/21/20  1:29 PM   Ceruloplasmin   20.0 - 60.0 mg/dL 29.0      Component   Ref Range & Units 9/21/20  1:29 PM   Zinc   60.0 - 120.0 ug/dL 97.2      Component   Ref Range & Units 9/21/20  1:29 PM   Carotene, Total, Serum   60 - 200 ug/dL 58Low       Component   Ref Range & Units 9/21/20  1:29 PM   Folate (Folic Acid)   >=8.7 ng/mL 7.4Low       DEXA 6/11/18:  CONCLUSION:  Bone mineral density values for lumbar spine are compatible with the diagnosis of osteopenia by WHO definition (T score between -1.0 and -2.5).  If therapy is initiated, follow-up study is recommended in 2 years for further evaluation of   therapeutic efficacy.        The World Health Organization has defined the following categories based on bone density:  Normal bone:  T-score better than -1  Osteopenia: T-score between -1 and -2.5  Osteoporosis:  T-score less than -2.5  .  ASSESSMENT/PLAN:   Estela Rose is a 54 year old year-old female with history of celiac disease (remote dx), lymphocytic  colitis (2016, on periodic busesonide), IBS-D, anxiety/stress, duodenal cyst (prior EUS shows benign findnigs), hx of diverticulitis who presents for f/u.    #Celiac - prior TTG has been normal range. DEXA scan uptodate. Pneumococcal vaccine recommended previously.     #Diverticulitis- second episode in July 2023, CT scan reviewed.-high-fiber diet recommended she is already on a plant-based diet.  She already avoids constipation.    #IBS-D -significant social stressors with her mom and sister, plan to trial IBGard as needed.    #Hx of lymphocytic colitis- plan for repeat CLN with biopsy    1. Schedule colonoscopy with [facility] [Diagnosis: screening]    2.  bowel prep from pharmacy (split Sutab)    3. Continue all medications as normal for your procedure.    4. Read all bowel prep instructions carefully. Bowel prep instructions can also be found online at:  [email or web address]     5. AVOID seeds, nuts, popcorn, raw fruits and vegetables for 3 days before procedure    6. You MAY need to go for COVID testing 72 hours before procedure. The testing team will call you a few days before your procedure to discuss with you if testing is required. If you are asked to go for COVID testing and do not completed the test, the procedure cannot be performed.     7. If you start any NEW medication after your visit today, please notify us. Certain medications (like iron or weight loss medications) will need to be held before the procedure, or the procedure cannot be performed safely.      8. High fiber diet    9. ADD as needed IBgard for IBS-diarrhea symptoms    Orders This Visit:  No orders of the defined types were placed in this encounter.      Meds This Visit:  Requested Prescriptions     Signed Prescriptions Disp Refills    Sodium Sulfate-Mag Sulfate-KCl (SUTAB) 6896-810-028 MG Oral Tab 24 tablet 0     Sig: Take 1 Package by mouth As Directed for 1 day. Take as directed by GI clinic.       Imaging &  Referrals:  None      Alice Perez, APRN   10/28/2020        This note was partially prepared using Dragon Medical voice recognition dictation software. As a result, errors may occur. When identified, these errors have been corrected. While every attempt is made to correct errors during dictation, discrepancies may still exist.

## 2024-07-15 PROBLEM — Q43.8 TORTUOUS COLON: Status: ACTIVE | Noted: 2024-07-15

## 2024-07-15 PROBLEM — K57.30 DIVERTICULA OF COLON: Status: ACTIVE | Noted: 2024-07-15

## 2024-07-15 PROBLEM — K64.8 INTERNAL HEMORRHOIDS: Status: ACTIVE | Noted: 2024-07-15

## 2024-07-15 PROCEDURE — 88305 TISSUE EXAM BY PATHOLOGIST: CPT | Performed by: INTERNAL MEDICINE

## 2024-08-05 ENCOUNTER — TELEPHONE (OUTPATIENT)
Facility: CLINIC | Age: 54
End: 2024-08-05

## 2024-08-05 NOTE — TELEPHONE ENCOUNTER
I mailed out colonoscopy results letter to pt  Updated health maintenance  Entered into 10 yr CLN recall  Recall colon in 10 years per. Colon done 7/15/2024.    KYLE Pickard MD  [facility] Clinical Staff  GI staff: please place recall in for colonoscopy in 10 years

## (undated) DIAGNOSIS — K31.9 SUBEPITHELIAL LESION OF DUODENUM: Primary | ICD-10-CM

## (undated) DEVICE — LINE MNTR ADLT SET O2 INTMD

## (undated) DEVICE — 35 ML SYRINGE REGULAR TIP: Brand: MONOJECT

## (undated) DEVICE — FORCEP RADIAL JAW 4

## (undated) DEVICE — MEDI-VAC NON-CONDUCTIVE SUCTION TUBING 6MM X 1.8M (6FT.) L: Brand: CARDINAL HEALTH

## (undated) DEVICE — CONMED SCOPE SAVER BITE BLOCK, 20X27 MM: Brand: SCOPE SAVER

## (undated) DEVICE — Device: Brand: DEFENDO AIR/WATER/SUCTION AND BIOPSY VALVE

## (undated) DEVICE — Device: Brand: CUSTOM PROCEDURE KIT

## (undated) DEVICE — KIT ENDO ORCAPOD 160/180/190

## (undated) DEVICE — KIT CLEAN ENDOKIT 1.1OZ GOWNX2

## (undated) DEVICE — Device: Brand: BALLOON3

## (undated) NOTE — LETTER
01/12/19        Anthony Graves  [address]      Dear Stan snowden,    [facility] records indicate that you have outstanding lab work and or testing that was ordered for you and has not yet been completed:  Orders Placed This Encounter

## (undated) NOTE — LETTER
1/12/2022    Jairo Marcum and [facility]            Dear Jairo Espana,      Our records indicate that you are due for an appointment for an EGD or Upper Endoscopy and EUS or endoscopic ultrasound in April 2022, or sometime there after, with Carol Laird MD.    Please call our office to schedule this appointment. Your medical well-being is important to us. If your insurance requires a referral, please call your primary care office to request one.       Thank you,      The Physicians and Staff at [facility]

## (undated) NOTE — MR AVS SNAPSHOT
[address]  [address] [id number] [phone]               Thank you for choosing us for your health care visit with Xin Murray MD.  We are glad to serve you and happy to provide you with this summary o Vitamin B-12 1000 MCG Tabs   Take 1,000 mcg by mouth daily. Commonly known as:  VITAMIN B12                   Today's Orders     CMP    Complete by:   Feb 01, 2017 (Approximate)    Assoc Dx:  Celiac disease [K90.0], Lymphocytic colitis [K52.832], Exce Excessive or frequent menstruation [N92.0], Other iron deficiency anemia [D50.8], Left lower quadrant pain [R10.32]           Iron And Tibc [E]    Complete by:   Feb 01, 2017 (Approximate)    Assoc Dx:  Celiac disease [K90.0], Lymphocytic colitis [K52.832],

## (undated) NOTE — LETTER
[facility]  Administration of Anesthesia  1. Kali Dsouza, or _________________________________ acting on her behalf, (Patient) (Dependent/Representative) request to receive anesthesia for my pending procedure/operation/treatment. A physician (anesthesiologist) alone or an anesthesiologist working with a nurse anesthetist may administer my anesthesia. 2. I understand that my anesthesiologist is not an employee or agent of the hospital, but is an independent medical practitioner who has been permitted to use its facilities for the care and treatment of his/her patients. 3. I acknowledge that a physician from [facility] [address] or their designate(s), recommended anesthesia for me using her/his medical judgment. The type(s) of anesthesia I may receive include:                a) General Anesthesia, b) Spinal/Epidural Anesthesia, c) Regional Anesthesia or d) Monitored Anesthesia Care. 4. If my spinal, regional or monitored anesthesia care (local) is not satisfactory for my comfort, or if my medical condition requires, I consent to the administration of general anesthesia. 5. I am aware that the practice of anesthesiology is not an exact science and that some foreseeable risks or consequences may occur. Some common risks/consequences include sore throat and hoarseness, nausea and vomiting, muscle soreness, backache, damage to the mouth/teeth/vocal cords and eye injury. I understand that more rare but serious potential risks of anesthesia include blood pressure changes, drug reactions, cardiac arrest, brain damage, paralysis or death. These risks apply to whether I have general, spinal/epidural, regional or monitored anesthesia care. 6. OBSTETRIC PATIENTS: Specific risks/consequences of spinal/epidural anesthesia may include itching, low blood pressure, difficulty urinating, slowing of the baby's heart rate and headache.  Rare risks include infections, high spinal block, spinal bleeding, seizure, cardiac arrest and death. 7. AWARENESS: I understand that it is possible (but unlikely) to have explicit memory of events from the operating room while under general anesthesia. 8. ELECTROCONVULSIVE THERAPY PATIENTS: This consent serves for all treatments in a single course of therapy. 9. I understand that I must inform my anesthesiologist when I last ate and/or drank to minimize the risk of anesthesia. 10. If I am pregnant, or may pregnant, I understand that elective surgery should be postponed until after the baby is born. Anesthetics cross the placenta and may temporarily anesthetize the baby. Although fetal complications of anesthesia during pregnancy are rare, they may include birth defects, premature labor, brain damage and death. 11. I certify that I informed the anesthesiologist, to the best of my ability, about medication I take including blood thinners, anticoagulants, herbal remedies, narcotics and recreational drugs (e.g. cocaine, marijuana, PCP). Failure to inform my anesthesiologist about these medicines may increase my risk of anesthetic complications. The nature and purpose of my anesthetic management was explained to me. I had the opportunity to ask questions and the answers and information provided meet my satisfaction.   I retain the right to withdraw this consent at any time prior to the administration of said anesthetic.    ___________________________________________________           _____________________________________________________  Patient Signature                                                                                      Witness Signature                ___________________________________________________           _____________________________________________________  Date/Time                                                                                               Responsible person in case of minor/ unconscious pt /Relationship    My signature below affirms that prior to the time of the procedure, I have explained to the patient and/or his/her guardian, the risks and benefits of undergoing anesthesia, as well as any reasonable alternatives.     ___________________________________________________            _____________________________________________________  Physician Signature                            Date/Time  Patient Name: Megan Kay     : 1/3/1970     Printed: 2022      Medical Record #: [id number]                              Page 1 of 1    ----------ANESTHESIA CONSENT----------

## (undated) NOTE — Clinical Note
Hi Dr. Rusty Ballard, thank you for referring Princess Honeycutt who has Celiac disease and lymphocytic colitis. Her celiac disease seems to be well controlled on gluten free diet. I have asked her to get a DEXA scan and some labs.  I also recommend a pneumovax shot which she

## (undated) NOTE — LETTER
[facility], [place]  Authorization for Invasive Procedures  1. I hereby authorize Dr. Jennifer Wu , my physician and whomever may be designated as the doctor's assistant, to perform the following operation and/or procedure:  Esophagogastroduodenoscopy/ Endoscopic ultrasound with possible fine needle aspiration on Estela Rose at [facility].    2. My physician has explained to me the nature and purpose of the operation or other procedure, possible alternative methods of treatment, the risks involved and the possibility of complications to me. I understand the probable consequences of declining the recommended procedure and the alternative methods of treatment. I acknowledge that no guarantee has been made as to the result that may be obtained. 3. I recognize that during the course of this operation or other procedure, unforeseen conditions may necessitate additional or different procedures than those listed above. I, therefore, further authorize and request that the above-named physician, his/her physician assistants, or designees perform such procedures as are, in his/her professional opinion, necessary and desirable. If I have a Do Not Attempt Resuscitation (DNAR) order in place, that status will be suspended while in the operating room, procedural suite, and during the recovery period unless otherwise explicitly stated by me (or a person authorized to consent on my behalf). The surgeon or my attending physician will determine when the applicable recovery period ends for purposes of reinstating the DNAR order. 4. Should the need arise during my operation or immediate post-operative period; I also consent to the administration of blood and/or blood products.  Further, I understand that despite careful testing and screening of blood and blood products, I may still be subject to ill effects as a result of recieving a blood transfusion an/or blood producst. The following are some, but not all, of the potential risks that can occur: fever and allergic reactions, hemolytic reactions, transmission of disease such as hepatitis, AIDS, cytomegalovirus (CMV), and flluid overload. In the event that I wish to have autologous transfusions of my own blood, or a directed donor transfusion, I will discuss this with my physician. 5. I consent to the photographing of the operations or procedures to be performed for the purposes of advancing medicine, science, and/or education, provided my identity is not revealed. If the procedure has been videotaped, the physician/surgeon will obtain the original videotape. The [facility] will not be responsible for storage or maintenance of this tape. 6. I consent to the presence of a  or observer as deemed necessary by my physician or his designee. 7. Any tissues or organs removed in the operation or other procedure may be disposed of by and at the discretion of [facility].    8. I understand that the physician and his/her physician assistants may not be employees or agents of [facility], [facility], nor [facility], but are independent medical practitioners who have been permitted to use its facilities for the care and treatment of their patients. 9. Patients having a sterilization procedure: I understand that if the procedure is successful the results will be permanent and it will therefore be impossible for me to inseminate, conceive or bear children. I also understand that the procedure is intended to result in sterility, although the result has not been guaranteed. 10. I CERTIFY THAT I HAVE READ AND FULLY UNDERSTAND THE ABOVE CONSENT TO OPERATION and/or OTHER PROCEDURE. 11. I acknowledge that my physician has explained sedation/analgesia administration to me including the risks and benefits.  I consent to the administration of sedation/analgesia as may be necessary or desirable in the judgment of my physician. Signature of Patient:  ________________________________________________ Date: _________Time: _________    Responsible person in case of minor or unconscious: _____________________________Relationship: ____________     Witness Signature: ____________________________________________ Date: __________ Time: ___________    Statement of Physician  My signature below affirms that prior to the time of the procedure, I have explained to the patient and/or her legal representative, the risks and benefits involved in the proposed treatment and any reasonable alternative to the proposed treatment. I have also explained the risks and benefits involved in the refusal of the proposed treatment and have answered the patient's questions. If I have a significant financial interest in this procedure/surgery, I have disclosed this and had a discussion with my patient.     Signature of Physician:   ________________________________________Date: _________Time:_______ Patient Name: Sena Somers  : 1/3/1970   Printed: May 18, 2022    Medical Record #: [id number]